# Patient Record
Sex: MALE | Race: WHITE | NOT HISPANIC OR LATINO | Employment: OTHER | URBAN - METROPOLITAN AREA
[De-identification: names, ages, dates, MRNs, and addresses within clinical notes are randomized per-mention and may not be internally consistent; named-entity substitution may affect disease eponyms.]

---

## 2017-09-27 ENCOUNTER — COMMUNICATION - HEALTHEAST (OUTPATIENT)
Dept: FAMILY MEDICINE | Facility: CLINIC | Age: 52
End: 2017-09-27

## 2017-10-03 ENCOUNTER — OFFICE VISIT - HEALTHEAST (OUTPATIENT)
Dept: FAMILY MEDICINE | Facility: CLINIC | Age: 52
End: 2017-10-03

## 2017-10-03 DIAGNOSIS — N48.1 BALANITIS: ICD-10-CM

## 2017-10-03 DIAGNOSIS — J02.9 SORE THROAT: ICD-10-CM

## 2017-10-03 DIAGNOSIS — B35.6 TINEA CRURIS: ICD-10-CM

## 2017-10-11 ENCOUNTER — COMMUNICATION - HEALTHEAST (OUTPATIENT)
Dept: FAMILY MEDICINE | Facility: CLINIC | Age: 52
End: 2017-10-11

## 2017-11-16 ENCOUNTER — AMBULATORY - HEALTHEAST (OUTPATIENT)
Dept: FAMILY MEDICINE | Facility: CLINIC | Age: 52
End: 2017-11-16

## 2017-11-16 ENCOUNTER — COMMUNICATION - HEALTHEAST (OUTPATIENT)
Dept: FAMILY MEDICINE | Facility: CLINIC | Age: 52
End: 2017-11-16

## 2017-11-28 ENCOUNTER — OFFICE VISIT - HEALTHEAST (OUTPATIENT)
Dept: FAMILY MEDICINE | Facility: CLINIC | Age: 52
End: 2017-11-28

## 2017-11-28 DIAGNOSIS — B35.9 TINEA: ICD-10-CM

## 2017-11-28 DIAGNOSIS — M79.675 PAIN OF GREAT TOE, LEFT: ICD-10-CM

## 2017-11-28 DIAGNOSIS — L30.9 DERMATITIS: ICD-10-CM

## 2017-11-28 DIAGNOSIS — Z00.00 HEALTH CARE MAINTENANCE: ICD-10-CM

## 2018-01-18 ENCOUNTER — OFFICE VISIT - HEALTHEAST (OUTPATIENT)
Dept: FAMILY MEDICINE | Facility: CLINIC | Age: 53
End: 2018-01-18

## 2018-01-18 DIAGNOSIS — N48.1 BALANITIS: ICD-10-CM

## 2018-01-18 DIAGNOSIS — Z00.00 HEALTH CARE MAINTENANCE: ICD-10-CM

## 2018-01-18 LAB
ALBUMIN SERPL-MCNC: 4.2 G/DL (ref 3.5–5)
ALP SERPL-CCNC: 76 U/L (ref 45–120)
ALT SERPL W P-5'-P-CCNC: 40 U/L (ref 0–45)
ANION GAP SERPL CALCULATED.3IONS-SCNC: 11 MMOL/L (ref 5–18)
AST SERPL W P-5'-P-CCNC: 28 U/L (ref 0–40)
BILIRUB SERPL-MCNC: 1.8 MG/DL (ref 0–1)
BUN SERPL-MCNC: 13 MG/DL (ref 8–22)
CALCIUM SERPL-MCNC: 9.8 MG/DL (ref 8.5–10.5)
CHLORIDE BLD-SCNC: 102 MMOL/L (ref 98–107)
CHOLEST SERPL-MCNC: 233 MG/DL
CO2 SERPL-SCNC: 27 MMOL/L (ref 22–31)
CREAT SERPL-MCNC: 0.96 MG/DL (ref 0.7–1.3)
ERYTHROCYTE [DISTWIDTH] IN BLOOD BY AUTOMATED COUNT: 10.9 % (ref 11–14.5)
FASTING STATUS PATIENT QL REPORTED: YES
GFR SERPL CREATININE-BSD FRML MDRD: >60 ML/MIN/1.73M2
GLUCOSE BLD-MCNC: 90 MG/DL (ref 70–125)
HCT VFR BLD AUTO: 49.7 % (ref 40–54)
HDLC SERPL-MCNC: 68 MG/DL
HGB BLD-MCNC: 16.8 G/DL (ref 14–18)
HIV 1+2 AB+HIV1 P24 AG SERPL QL IA: NEGATIVE
LDLC SERPL CALC-MCNC: 153 MG/DL
MCH RBC QN AUTO: 32.9 PG (ref 27–34)
MCHC RBC AUTO-ENTMCNC: 33.8 G/DL (ref 32–36)
MCV RBC AUTO: 98 FL (ref 80–100)
PLATELET # BLD AUTO: 226 THOU/UL (ref 140–440)
PMV BLD AUTO: 7.8 FL (ref 7–10)
POTASSIUM BLD-SCNC: 4.3 MMOL/L (ref 3.5–5)
PROT SERPL-MCNC: 8 G/DL (ref 6–8)
PSA SERPL-MCNC: 1.3 NG/ML (ref 0–3.5)
RBC # BLD AUTO: 5.1 MILL/UL (ref 4.4–6.2)
SODIUM SERPL-SCNC: 140 MMOL/L (ref 136–145)
TRIGL SERPL-MCNC: 62 MG/DL
WBC: 5.9 THOU/UL (ref 4–11)

## 2018-01-18 ASSESSMENT — MIFFLIN-ST. JEOR: SCORE: 1777.36

## 2018-01-19 LAB
C TRACH DNA SPEC QL PROBE+SIG AMP: NEGATIVE
HSV1 IGG SERPL QL IA: POSITIVE
HSV2 IGG SERPL QL IA: NEGATIVE
N GONORRHOEA DNA SPEC QL NAA+PROBE: NEGATIVE
SYPHILIS RPR SCREEN - HISTORICAL: NORMAL

## 2018-06-05 ENCOUNTER — OFFICE VISIT - HEALTHEAST (OUTPATIENT)
Dept: FAMILY MEDICINE | Facility: CLINIC | Age: 53
End: 2018-06-05

## 2018-06-05 DIAGNOSIS — N48.1 BALANITIS: ICD-10-CM

## 2018-06-05 DIAGNOSIS — M15.9 GENERALIZED OA: ICD-10-CM

## 2018-06-05 DIAGNOSIS — M72.2 PLANTAR FASCIITIS: ICD-10-CM

## 2018-07-24 ENCOUNTER — COMMUNICATION - HEALTHEAST (OUTPATIENT)
Dept: FAMILY MEDICINE | Facility: CLINIC | Age: 53
End: 2018-07-24

## 2018-07-24 ENCOUNTER — COMMUNICATION - HEALTHEAST (OUTPATIENT)
Dept: SCHEDULING | Facility: CLINIC | Age: 53
End: 2018-07-24

## 2018-07-24 ENCOUNTER — OFFICE VISIT - HEALTHEAST (OUTPATIENT)
Dept: FAMILY MEDICINE | Facility: CLINIC | Age: 53
End: 2018-07-24

## 2018-07-24 DIAGNOSIS — N48.1 BALANITIS: ICD-10-CM

## 2018-07-24 DIAGNOSIS — K57.32 DIVERTICULITIS OF COLON: ICD-10-CM

## 2018-12-23 ENCOUNTER — OFFICE VISIT - HEALTHEAST (OUTPATIENT)
Dept: FAMILY MEDICINE | Facility: CLINIC | Age: 53
End: 2018-12-23

## 2018-12-23 DIAGNOSIS — K57.92 DIVERTICULITIS: ICD-10-CM

## 2019-01-29 ENCOUNTER — OFFICE VISIT - HEALTHEAST (OUTPATIENT)
Dept: FAMILY MEDICINE | Facility: CLINIC | Age: 54
End: 2019-01-29

## 2019-01-29 ENCOUNTER — RECORDS - HEALTHEAST (OUTPATIENT)
Dept: GENERAL RADIOLOGY | Facility: CLINIC | Age: 54
End: 2019-01-29

## 2019-01-29 DIAGNOSIS — L30.9 DERMATITIS: ICD-10-CM

## 2019-01-29 DIAGNOSIS — G89.29 OTHER CHRONIC PAIN: ICD-10-CM

## 2019-01-29 DIAGNOSIS — G89.29 CHRONIC RIGHT SHOULDER PAIN: ICD-10-CM

## 2019-01-29 DIAGNOSIS — M25.511 CHRONIC RIGHT SHOULDER PAIN: ICD-10-CM

## 2019-01-29 DIAGNOSIS — M25.511 PAIN IN RIGHT SHOULDER: ICD-10-CM

## 2019-01-29 DIAGNOSIS — R91.1 LUNG NODULE: ICD-10-CM

## 2019-01-31 ENCOUNTER — HOSPITAL ENCOUNTER (OUTPATIENT)
Dept: CT IMAGING | Facility: HOSPITAL | Age: 54
Discharge: HOME | End: 2019-01-31
Attending: FAMILY MEDICINE

## 2019-01-31 DIAGNOSIS — R91.1 LUNG NODULE: ICD-10-CM

## 2019-02-13 ENCOUNTER — RECORDS - HEALTHEAST (OUTPATIENT)
Dept: ADMINISTRATIVE | Facility: OTHER | Age: 54
End: 2019-02-13

## 2019-03-13 ENCOUNTER — RECORDS - HEALTHEAST (OUTPATIENT)
Dept: ADMINISTRATIVE | Facility: OTHER | Age: 54
End: 2019-03-13

## 2019-11-22 ENCOUNTER — RECORDS - HEALTHEAST (OUTPATIENT)
Dept: ADMINISTRATIVE | Facility: OTHER | Age: 54
End: 2019-11-22

## 2020-01-10 ENCOUNTER — OFFICE VISIT - HEALTHEAST (OUTPATIENT)
Dept: FAMILY MEDICINE | Facility: CLINIC | Age: 55
End: 2020-01-10

## 2020-01-10 DIAGNOSIS — R06.02 SHORTNESS OF BREATH: ICD-10-CM

## 2020-01-10 DIAGNOSIS — Z00.00 HEALTH CARE MAINTENANCE: ICD-10-CM

## 2020-01-10 DIAGNOSIS — B35.6 TINEA CRURIS: ICD-10-CM

## 2020-01-10 DIAGNOSIS — R07.9 CHEST PAIN, UNSPECIFIED TYPE: ICD-10-CM

## 2020-01-10 DIAGNOSIS — G56.03 BILATERAL CARPAL TUNNEL SYNDROME: ICD-10-CM

## 2020-01-10 DIAGNOSIS — B35.4 TINEA CORPORIS: ICD-10-CM

## 2020-01-10 LAB
ALBUMIN SERPL-MCNC: 4.4 G/DL (ref 3.5–5)
ALP SERPL-CCNC: 78 U/L (ref 45–120)
ALT SERPL W P-5'-P-CCNC: 47 U/L (ref 0–45)
ANION GAP SERPL CALCULATED.3IONS-SCNC: 10 MMOL/L (ref 5–18)
AST SERPL W P-5'-P-CCNC: 25 U/L (ref 0–40)
BILIRUB SERPL-MCNC: 1 MG/DL (ref 0–1)
BUN SERPL-MCNC: 20 MG/DL (ref 8–22)
CALCIUM SERPL-MCNC: 9.6 MG/DL (ref 8.5–10.5)
CHLORIDE BLD-SCNC: 105 MMOL/L (ref 98–107)
CHOLEST SERPL-MCNC: 252 MG/DL
CO2 SERPL-SCNC: 24 MMOL/L (ref 22–31)
CREAT SERPL-MCNC: 1.2 MG/DL (ref 0.7–1.3)
ERYTHROCYTE [DISTWIDTH] IN BLOOD BY AUTOMATED COUNT: 11.4 % (ref 11–14.5)
FASTING STATUS PATIENT QL REPORTED: ABNORMAL
FERRITIN SERPL-MCNC: 400 NG/ML (ref 27–300)
GFR SERPL CREATININE-BSD FRML MDRD: >60 ML/MIN/1.73M2
GLUCOSE BLD-MCNC: 106 MG/DL (ref 70–125)
HBA1C MFR BLD: 5.1 % (ref 3.5–6)
HCT VFR BLD AUTO: 46.3 % (ref 40–54)
HDLC SERPL-MCNC: 66 MG/DL
HGB BLD-MCNC: 15.8 G/DL (ref 14–18)
LDLC SERPL CALC-MCNC: 174 MG/DL
MCH RBC QN AUTO: 32.8 PG (ref 27–34)
MCHC RBC AUTO-ENTMCNC: 34.1 G/DL (ref 32–36)
MCV RBC AUTO: 96 FL (ref 80–100)
PLATELET # BLD AUTO: 199 THOU/UL (ref 140–440)
PMV BLD AUTO: 7.4 FL (ref 7–10)
POTASSIUM BLD-SCNC: 4 MMOL/L (ref 3.5–5)
PROT SERPL-MCNC: 7.6 G/DL (ref 6–8)
PSA SERPL-MCNC: 0.7 NG/ML (ref 0–3.5)
RBC # BLD AUTO: 4.82 MILL/UL (ref 4.4–6.2)
SODIUM SERPL-SCNC: 139 MMOL/L (ref 136–145)
TRIGL SERPL-MCNC: 61 MG/DL
TSH SERPL DL<=0.005 MIU/L-ACNC: 1.65 UIU/ML (ref 0.3–5)
VIT B12 SERPL-MCNC: 687 PG/ML (ref 213–816)
WBC: 7.3 THOU/UL (ref 4–11)

## 2020-01-10 RX ORDER — CALCIPOTRIENE 50 UG/G
OINTMENT TOPICAL
Status: SHIPPED | COMMUNITY
Start: 2019-11-24

## 2020-01-10 ASSESSMENT — MIFFLIN-ST. JEOR: SCORE: 1841.1

## 2020-01-13 ENCOUNTER — COMMUNICATION - HEALTHEAST (OUTPATIENT)
Dept: FAMILY MEDICINE | Facility: CLINIC | Age: 55
End: 2020-01-13

## 2020-01-13 ENCOUNTER — AMBULATORY - HEALTHEAST (OUTPATIENT)
Dept: FAMILY MEDICINE | Facility: CLINIC | Age: 55
End: 2020-01-13

## 2020-01-13 DIAGNOSIS — E78.2 MIXED HYPERLIPIDEMIA: ICD-10-CM

## 2020-01-13 RX ORDER — ATORVASTATIN CALCIUM 20 MG/1
20 TABLET, FILM COATED ORAL DAILY
Qty: 30 TABLET | Refills: 11 | Status: SHIPPED | OUTPATIENT
Start: 2020-01-13

## 2020-01-17 ENCOUNTER — HOSPITAL ENCOUNTER (OUTPATIENT)
Dept: NUCLEAR MEDICINE | Facility: HOSPITAL | Age: 55
Discharge: HOME | End: 2020-01-17
Attending: FAMILY MEDICINE

## 2020-01-17 ENCOUNTER — HOSPITAL ENCOUNTER (OUTPATIENT)
Dept: CARDIOLOGY | Facility: HOSPITAL | Age: 55
Discharge: HOME | End: 2020-01-17
Attending: FAMILY MEDICINE

## 2020-01-17 ENCOUNTER — COMMUNICATION - HEALTHEAST (OUTPATIENT)
Dept: FAMILY MEDICINE | Facility: CLINIC | Age: 55
End: 2020-01-17

## 2020-01-17 ENCOUNTER — AMBULATORY - HEALTHEAST (OUTPATIENT)
Dept: FAMILY MEDICINE | Facility: CLINIC | Age: 55
End: 2020-01-17

## 2020-01-17 DIAGNOSIS — R07.9 CHEST PAIN, UNSPECIFIED TYPE: ICD-10-CM

## 2020-01-17 DIAGNOSIS — R06.02 SHORTNESS OF BREATH: ICD-10-CM

## 2020-01-17 LAB
CV STRESS CURRENT BP HE: NORMAL
CV STRESS CURRENT HR HE: 100
CV STRESS CURRENT HR HE: 106
CV STRESS CURRENT HR HE: 108
CV STRESS CURRENT HR HE: 108
CV STRESS CURRENT HR HE: 110
CV STRESS CURRENT HR HE: 110
CV STRESS CURRENT HR HE: 111
CV STRESS CURRENT HR HE: 114
CV STRESS CURRENT HR HE: 120
CV STRESS CURRENT HR HE: 124
CV STRESS CURRENT HR HE: 126
CV STRESS CURRENT HR HE: 130
CV STRESS CURRENT HR HE: 133
CV STRESS CURRENT HR HE: 134
CV STRESS CURRENT HR HE: 135
CV STRESS CURRENT HR HE: 136
CV STRESS CURRENT HR HE: 137
CV STRESS CURRENT HR HE: 137
CV STRESS CURRENT HR HE: 139
CV STRESS CURRENT HR HE: 140
CV STRESS CURRENT HR HE: 141
CV STRESS CURRENT HR HE: 145
CV STRESS CURRENT HR HE: 147
CV STRESS CURRENT HR HE: 147
CV STRESS CURRENT HR HE: 152
CV STRESS CURRENT HR HE: 74
CV STRESS CURRENT HR HE: 74
CV STRESS CURRENT HR HE: 75
CV STRESS CURRENT HR HE: 76
CV STRESS CURRENT HR HE: 82
CV STRESS CURRENT HR HE: 87
CV STRESS CURRENT HR HE: 88
CV STRESS CURRENT HR HE: 91
CV STRESS CURRENT HR HE: 92
CV STRESS CURRENT HR HE: 95
CV STRESS CURRENT HR HE: 96
CV STRESS CURRENT HR HE: 96
CV STRESS CURRENT HR HE: 98
CV STRESS DEVIATION TIME HE: NORMAL
CV STRESS ECHO PERCENT HR HE: NORMAL
CV STRESS EXERCISE STAGE HE: NORMAL
CV STRESS FINAL RESTING BP HE: NORMAL
CV STRESS FINAL RESTING HR HE: 95
CV STRESS MAX HR HE: 156
CV STRESS MAX TREADMILL GRADE HE: 16
CV STRESS MAX TREADMILL SPEED HE: 4.2
CV STRESS PEAK DIA BP HE: NORMAL
CV STRESS PEAK SYS BP HE: NORMAL
CV STRESS PHASE HE: NORMAL
CV STRESS PROTOCOL HE: NORMAL
CV STRESS RESTING PT POSITION HE: NORMAL
CV STRESS RESTING PT POSITION HE: NORMAL
CV STRESS ST DEVIATION AMOUNT HE: NORMAL
CV STRESS ST DEVIATION ELEVATION HE: NORMAL
CV STRESS ST EVELATION AMOUNT HE: NORMAL
CV STRESS TEST TYPE HE: NORMAL
CV STRESS TOTAL STAGE TIME MIN 1 HE: NORMAL
NUC STRESS EJECTION FRACTION: 55 %
RATE PRESSURE PRODUCT: NORMAL
STRESS ANGINA INDEX: 2
STRESS ECHO BASELINE DIASTOLIC HE: 73
STRESS ECHO BASELINE HR: 74
STRESS ECHO BASELINE SYSTOLIC BP: 108
STRESS ECHO CALCULATED PERCENT HR: 93 %
STRESS ECHO LAST STRESS DIASTOLIC BP: 82
STRESS ECHO LAST STRESS HR: 152
STRESS ECHO LAST STRESS SYSTOLIC BP: 148
STRESS ECHO POST ESTIMATED WORKLOAD: 10.9 METS
STRESS ECHO POST EXERCISE DUR MIN: 9 MIN
STRESS ECHO POST EXERCISE DUR SEC: 20 SEC
STRESS ECHO TARGET HR: 167

## 2020-01-17 ASSESSMENT — MIFFLIN-ST. JEOR: SCORE: 1818.76

## 2020-01-23 ENCOUNTER — HOSPITAL ENCOUNTER (OUTPATIENT)
Dept: RESPIRATORY THERAPY | Facility: HOSPITAL | Age: 55
Discharge: HOME | End: 2020-01-23
Attending: FAMILY MEDICINE

## 2020-01-23 DIAGNOSIS — R06.02 SHORTNESS OF BREATH: ICD-10-CM

## 2020-02-13 ENCOUNTER — COMMUNICATION - HEALTHEAST (OUTPATIENT)
Dept: FAMILY MEDICINE | Facility: CLINIC | Age: 55
End: 2020-02-13

## 2020-02-13 DIAGNOSIS — B35.6 TINEA CRURIS: ICD-10-CM

## 2020-02-13 DIAGNOSIS — B35.4 TINEA CORPORIS: ICD-10-CM

## 2020-02-17 RX ORDER — CLOTRIMAZOLE 1 %
CREAM (GRAM) TOPICAL
Qty: 30 G | Refills: 0 | Status: SHIPPED | OUTPATIENT
Start: 2020-02-17

## 2020-02-17 RX ORDER — DESONIDE 0.5 MG/G
CREAM TOPICAL
Qty: 60 G | Refills: 1 | Status: SHIPPED | OUTPATIENT
Start: 2020-02-17

## 2020-02-19 ENCOUNTER — COMMUNICATION - HEALTHEAST (OUTPATIENT)
Dept: SCHEDULING | Facility: CLINIC | Age: 55
End: 2020-02-19

## 2020-02-19 DIAGNOSIS — K57.32 DIVERTICULITIS OF COLON: ICD-10-CM

## 2020-04-28 ENCOUNTER — COMMUNICATION - HEALTHEAST (OUTPATIENT)
Dept: SCHEDULING | Facility: CLINIC | Age: 55
End: 2020-04-28

## 2020-04-28 ENCOUNTER — OFFICE VISIT - HEALTHEAST (OUTPATIENT)
Dept: FAMILY MEDICINE | Facility: CLINIC | Age: 55
End: 2020-04-28

## 2020-04-28 DIAGNOSIS — L40.9 PSORIASIS: ICD-10-CM

## 2020-04-28 RX ORDER — TACROLIMUS 0.3 MG/G
OINTMENT TOPICAL
Qty: 100 G | Refills: 0 | Status: SHIPPED | OUTPATIENT
Start: 2020-04-28

## 2020-04-28 NOTE — ASSESSMENT & PLAN NOTE
Patient reports flare of his psoriasis, rash was evaluated somewhat but not to my satisfaction by video visit which was not a good connection and I was only able to see his face for a few moments during the long interaction where we went back and fourth from [facility] to [facility]. The rash on his face could have been consistent with psoriasis.     protopic topical was rx'd   Derm consult was recommended. Order placed.

## 2020-05-05 ENCOUNTER — RECORDS - HEALTHEAST (OUTPATIENT)
Dept: ADMINISTRATIVE | Facility: OTHER | Age: 55
End: 2020-05-05

## 2020-05-15 ENCOUNTER — RECORDS - HEALTHEAST (OUTPATIENT)
Dept: ADMINISTRATIVE | Facility: OTHER | Age: 55
End: 2020-05-15

## 2021-05-28 ENCOUNTER — RECORDS - HEALTHEAST (OUTPATIENT)
Dept: ADMINISTRATIVE | Facility: CLINIC | Age: 56
End: 2021-05-28

## 2021-05-29 ENCOUNTER — RECORDS - HEALTHEAST (OUTPATIENT)
Dept: ADMINISTRATIVE | Facility: CLINIC | Age: 56
End: 2021-05-29

## 2021-05-31 VITALS — BODY MASS INDEX: 30.96 KG/M2 | HEIGHT: 69 IN | WEIGHT: 209 LBS

## 2021-05-31 VITALS — WEIGHT: 206 LBS | BODY MASS INDEX: 30.42 KG/M2

## 2021-05-31 VITALS — BODY MASS INDEX: 32.34 KG/M2 | WEIGHT: 219 LBS

## 2021-06-01 VITALS — BODY MASS INDEX: 32.14 KG/M2 | WEIGHT: 219.2 LBS

## 2021-06-01 VITALS — WEIGHT: 222 LBS | BODY MASS INDEX: 32.55 KG/M2

## 2021-06-02 ENCOUNTER — RECORDS - HEALTHEAST (OUTPATIENT)
Dept: ADMINISTRATIVE | Facility: CLINIC | Age: 56
End: 2021-06-02

## 2021-06-02 VITALS — WEIGHT: 222 LBS | BODY MASS INDEX: 32.55 KG/M2

## 2021-06-02 VITALS — WEIGHT: 215.4 LBS | BODY MASS INDEX: 31.58 KG/M2

## 2021-06-04 VITALS
SYSTOLIC BLOOD PRESSURE: 129 MMHG | HEIGHT: 69 IN | RESPIRATION RATE: 16 BRPM | HEART RATE: 79 BPM | BODY MASS INDEX: 33.3 KG/M2 | TEMPERATURE: 98.3 F | OXYGEN SATURATION: 97 % | DIASTOLIC BLOOD PRESSURE: 83 MMHG | WEIGHT: 224.8 LBS

## 2021-06-04 VITALS — BODY MASS INDEX: 32.44 KG/M2 | HEIGHT: 69 IN | WEIGHT: 219 LBS

## 2021-06-05 NOTE — TELEPHONE ENCOUNTER
Pt notified of result note below. Pt stated he will start a cholesterol medication but want to know if there are any side effects or special instructions that go along with it. Pharmacy is listed as current per pt.     ----- Message from Benedict Holly MD sent at 1/13/2020  9:37 AM CST -----  B12 levels are normal.  Thyroid levels are normal as well.  Iron levels, ferritin, is elevated.  With normal hemoglobin levels I would like to recheck these levels in 1-2 months.   PSA is stable- we will monitor yearly.  No signs of diabetes.  Kidney and liver enzymes are normal.  Cholesterol levels remain elevated and I would recommend starting a cholesterol medication- if you are in agreement I will send on to the pharmacy.

## 2021-06-05 NOTE — TELEPHONE ENCOUNTER
Informed patient of message below.     Patient would like to know if the next step is to check the lung because of the shortness of breath. Please advise.

## 2021-06-05 NOTE — PROGRESS NOTES
ASSESSMENT/PLAN  1. Chest pain, unspecified type  Symptoms are not consistent, do not seem regular with activity- possible deconditioning or airway related like reactive airway diseased- due to age, other comorbid medical conditions we will further investigate cardiac etiology with stress test  If negative will proceed with pulmonary testing- patient is in agreement    2. Shortness of breath  Possible obstructive airway disease- will rule out cardiac etiology first- then pursue testing for lungs- pt is in agreement    3. Tinea cruris  Signs of tinea on exam- will continue with topica treatment  - desonide (DESOWEN) 0.05 % cream; Apply to affected area 2 times daily  Dispense: 60 g; Refill: 1    4. Tinea corporis  See number 3 above  - clotrimazole (LOTRIMIN) 1 % cream; Apply to skin rash twice daily  Dispense: 30 g; Refill: 0    5. Health care maintenance  Pt has some concerns about his health and labs and would like testing today  - HM2(CBC w/o Differential)  - Ferritin  - Comprehensive Metabolic Panel  - Glycosylated Hemoglobin A1c  - Lipid Cascade RANDOM  - Vitamin B12  - PSA (Prostatic-Specific Antigen), Annual Screen  - Thyroid Cascade    6. Bilateral carpal tunnel syndrome  Pt has been having some numbness in hands BL- especially at night- discussed limiting ROM of hands with activity during the day to prevent irritation        SUBJECTIVE:   Chief Complaint   Patient presents with     Shortness of Breath     c/o feeling short of breath with exertion, intermittent chest discomfort      Concerns     Would like to be tested for diabetes, lips turning darker in color, salt taste in mouth      Circulatory Problem     At night hands falling asleep at night      Rash     Various rashes all over     Mass     Right groin lump X3-4 days      Orlando Medeiros 54 y.o. male    Current Outpatient Medications   Medication Sig Dispense Refill     calcipotriene (DOVONOX) 0.005 % ointment APPLY AA ON BODY AND GROIN BID PRN    "    atorvastatin (LIPITOR) 20 MG tablet Take 1 tablet (20 mg total) by mouth daily. 30 tablet 11     clotrimazole (LOTRIMIN) 1 % cream Apply to skin rash twice daily 30 g 0     desonide (DESOWEN) 0.05 % cream Apply to affected area 2 times daily 60 g 1     No current facility-administered medications for this visit.      Allergies: Shellfish containing products   No LMP for male patient.    HPI:   Pt is here for multiple concerns today- including rash in the groin, on the torso and come chest discomfort and shortness of breath at times- not consistent and not with physical activity always. Discussed possible etiology- including cardiac, lung or physical deconditioning.  We discussed stress test which he is in agreement- and we will f/u based on results.  If negative- will pursue lung function testing.  Pt has rashes that bother him- both on torso and groin- discussed topical treatment for both tinea infections.  Pt's BL hands falling asleep at night - discussed likely carpal tunnel- pt would like to monitor at this time.  He feels like he needs some labs testing- overall just feels as though something is off- discussed lab work with the patient today.  Pt is in agreement and we will f/u based on results.    ROS: negative except as per HPI    OBJECTIVE:   The patient appears well, alert, oriented x 3, in no distress.  /83 (Patient Site: Left Arm, Patient Position: Sitting, Cuff Size: Adult Large)   Pulse 79   Temp 98.3  F (36.8  C) (Oral)   Resp 16   Ht 5' 8.75\" (1.746 m)   Wt (!) 224 lb 12.8 oz (102 kg)   SpO2 97%   BMI 33.44 kg/m      Lungs: clear, good air entry, no wheezes, rhonchi or rales.   Cardiac: S1 and S2 normal, no murmurs, regular rate and rhythm.   Abdomen: normal bowel sounds, soft without tenderness, guarding, mass or organomegaly.   Extremities: show no edema, normal peripheral pulses.   Neurological: normal, no focal findings.  Skin: clear, dry, no rashes/lesions  Psych- normal mood and " affect      Pt states an understanding and agrees to the above plan.  Greater than 40 minutes was spent today in interview and examination with Orlando Medeiros with more than 50% of that time in counseling and coordination of care.

## 2021-06-05 NOTE — TELEPHONE ENCOUNTER
----- Message from Benedict Holly MD sent at 1/17/2020  2:04 PM CST -----  Please inform patient that stress test showed no concerns.  No follow up is needed for this.  Dr. Holly

## 2021-06-05 NOTE — PROGRESS NOTES
RESPIRATORY CARE NOTE     Patient Name: Orlando Medeiros  Today's Date: 1/23/2020     RESPIRATORY CARE NOTE     Patient Name: Orlando Medeiros  Today's Date: 1/23/2020     Complete PFT done. Pt performed tests with good effort. Test results meet ATS standards for repeatbility and acceptability.  Albuterol 2.5 mg nebulizer used for bronchodilation. Results scanned into epic. Pt left in no distress.       Giana Fuentes, LRT          Giana Fuentes

## 2021-06-05 NOTE — TELEPHONE ENCOUNTER
I would like him to take the medication at night.  If new muscle or joint pain starts to contact me at clinic.  Dr. Holly

## 2021-06-06 NOTE — TELEPHONE ENCOUNTER
RN cannot approve Refill Request    RN can NOT refill this medication Protocol failed and NO refill given. Last office visit: 1/10/2020 Benedict Holly MD Last Physical: 1/18/2018 Last MTM visit: Visit date not found Last visit same specialty: 1/10/2020 Benedict Holly MD.  Next visit within 3 mo: Visit date not found  Next physical within 3 mo: Visit date not found      [place], [facility] Triage/Med Refill 2/16/2020    Requested Prescriptions   Pending Prescriptions Disp Refills     clotrimazole (LOTRIMIN) 1 % cream 30 g 0     Sig: Apply to skin rash twice daily       There is no refill protocol information for this order        desonide (DESOWEN) 0.05 % cream 60 g 1     Sig: Apply to affected area 2 times daily       There is no refill protocol information for this order

## 2021-06-06 NOTE — TELEPHONE ENCOUNTER
Please inform patient that medication was sent to the pharmacy of request  Please inform if symptoms are worsening he needs to be seen   Improved

## 2021-06-06 NOTE — TELEPHONE ENCOUNTER
RN Assessment/Reason for Call:   Okay to leave Detailed Message  Sarwat calling in, he is in [place] ; having a bout of diverticulitis, had for many years.  Needs/pefers Amoxicillin,tri cavilla (sp?) antibiotic he usually gets.  Abd and back pain nausea, constipation.  Comes back Sunday.    [address] [place] [address]  [phone]    RN Action/Disposition:  Vocera contacted; .  Agrees to plan.     Giana Aguilar, MARYCHUY    [facility] Triage/med refill  2/19/2020  10:41 AM

## 2021-06-07 NOTE — PROGRESS NOTES
ASSESSMENT AND PLAN:    see separate notes attached to this visit regarding details of  video visit and time spent.        Problem List Items Addressed This Visit        Unprioritized    Psoriasis - Primary     Patient reports flare of his psoriasis, rash was evaluated somewhat but not to my satisfaction by video visit which was not a good connection and I was only able to see his face for a few moments during the long interaction where we went back and fourth from [facility] to [facility]. The rash on his face could have been consistent with psoriasis.     protopic topical was rx'd   Derm consult was recommended. Order placed.          Relevant Medications    tacrolimus (PROTOPIC) 0.03 % ointment    Other Relevant Orders    Ambulatory referral to Dermatology           Chief Complaint   Patient presents with     Rash     Patient says he has had issues with fungus and psoriasis for a couple of years.  He says it's everywhere now - onto his face, ears and eyelids and legs.        HPI  Orlando Medeiros is a 54 y.o. male is concerned about a rash all over his body and tried to present via video visit today.  He says the rash is itchy. He tells me he has seen derm in the past and has had psoriasis for all his life.  He thinks his psoriasis is flaring.  When asked what has helped in the past he says nothing has ever helped.     I did review dermatology notes from 2019, and could not figure out what they treated him with because it appears they did a biopsy and treated based on that, and the result was not available.     Social History     Tobacco Use   Smoking Status Former Smoker     Last attempt to quit: 2015     Years since quittin.0   Smokeless Tobacco Never Used      Current Outpatient Medications on File Prior to Visit   Medication Sig Dispense Refill     atorvastatin (LIPITOR) 20 MG tablet Take 1 tablet (20 mg total) by mouth daily. 30 tablet 11     calcipotriene (DOVONOX) 0.005 % ointment APPLY AA ON BODY  AND GROIN BID PRN       clotrimazole (LOTRIMIN) 1 % cream Apply to skin rash twice daily 30 g 0     desonide (DESOWEN) 0.05 % cream Apply to affected area 2 times daily 60 g 1     No current facility-administered medications on file prior to visit.       Allergies   Allergen Reactions     Shellfish Containing Products Hives       Review of Systems   Constitutional: Negative.    HENT: Negative.    Eyes: Negative.    Respiratory: Negative.    Cardiovascular: Negative.    Gastrointestinal: Negative.    Endocrine: Negative.    Genitourinary: Negative.    Musculoskeletal: Negative.    Skin: Negative.    Neurological: Negative.    Hematological: Negative.    Psychiatric/Behavioral: Negative.         OBJECTIVE: There were no vitals taken for this visit.   Physical Exam  Constitutional:       General: He is not in acute distress.     Appearance: He is well-developed.   HENT:      Head: Normocephalic and atraumatic.   Eyes:      Conjunctiva/sclera: Conjunctivae normal.   Neck:      Musculoskeletal: Neck supple.   Pulmonary:      Effort: Pulmonary effort is normal.   Musculoskeletal: Normal range of motion.   Skin:     Comments: , although the wifi connection on his end did not seem strong enough to sustain video imaging, so although I did see him for part of the time, I could not really examine rash aside from grossly seeing his face intermittently during our interaction.  It did seem like his eyelids looked pink, but it was difficult to tell as the resolution of the picture was not very good.    Neurological:      Mental Status: He is alert and oriented to person, place, and time.          Additional History from Old Records Summarized (2): yes  Decision to Obtain Records (1): no  Radiology Tests Summarized or Ordered (1): no  Labs Reviewed or Ordered (1): yes looking for biopsy result  Medicine Test Summarized or Ordered (1): yes  Independent Review of EKG or X-RAY(2 each): no    This note was created using Dragon  dictation.  Please excuse any grammatical errors.

## 2021-06-07 NOTE — PROGRESS NOTES
"Orlando Medeiros is a 54 y.o. male who is being evaluated via a billable video visit.      The patient has been notified of following:     \"This video visit will be conducted via a call between you and your physician/provider. We have found that certain health care needs can be provided without the need for an in-person physical exam.  This service lets us provide the care you need with a video conversation.  If a prescription is necessary we can send it directly to your pharmacy.  If lab work is needed we can place an order for that and you can then stop by our lab to have the test done at a later time.    Video visits are billed at different rates depending on your insurance coverage. Please reach out to your insurance provider with any questions.    If during the course of the call the physician/provider feels a video visit is not appropriate, you will not be charged for this service.\"    Patient has given verbal consent to a Video visit? Yes    Patient would like to receive their AVS by AVS Preference: Don.    Patient would like the video invitation sent by: Send to e-mail at: [email or web address]    Will anyone else be joining your video visit? No        Video Start Time: 3:47 PM    Additional provider notes: see separate notes attached to this visit         Video-Visit Details    Type of service:  Video Visit    Video End Time (time video stopped): 4:04 PM  Originating Location (pt. Location): Home    Distant Location (provider location):  [facility]     Mode of Communication:  Video Conference via [employer] - he had very poor wifi connection so it was difficult to do this visit.  I did eventually switch to doximity and was able to see rash on his face for a few moments before that connection was also lost.      Traci Crawford MD    "

## 2021-06-07 NOTE — TELEPHONE ENCOUNTER
FNA triage call :  PCP is Benedict Holly MD , [facility] .   Presenting problem :  Pt called.  Has fungal infection and psiorosis Groin , buttock , chest , armpits and legs that  started 2 years ago ( groin area is raw for the last 2 years )  and working with PCP to treat = and is out of the topical cream Rx , and spreading to eyelids  With some swelling  , inside the left ear and spreading down legs and almost on entire body - itchy and intermittent scant  pus    and  Needs blood iron count is down ( Iron was high ) .  Currently : no fever, still working as   / 1&0 , eating and activity are all normal .   Guideline used : Rash or reness - widespread A [place].   COVID 19 Nurse Triage Plan/Patient Instructions    Please be aware that novel coronavirus (COVID-19) may be circulating in the community. If you develop symptoms such as fever, cough, or SOB or if you have concerns about the presence of another infection including coronavirus (COVID-19), please contact your health care provider or visit [email or web address].     Disposition/Instructions    Patient to stay at home and follow home care protocol based instructions. and Patient to have scheduled Telephone Visit with a provider. Follow System Ambulatory Workflow for COVID 19.     The clinic staff will assist you to schedule an appointment to complete the Telephone Visit with a provider during normal clinic hours.       Call Back If: Your symptoms worsen before you are able to complete your Telephone Visit with a provider.        Thank you for limiting contact with others, wearing a simple mask to cover your cough, practice good hand hygiene habits and accessing our virtual services where possible to limit the spread of this virus.    For more information about COVID19 and options for caring for yourself at home, please visit the CDC website at https://www.cdc.gov/coronavirus/2019-ncov/about/steps-when-sick.html  For more options for care at [facility], please visit our website at [email or web address]    For more information, please use the [place] Department of Health COVID-19 Website: https://www.health.[facility].[place]/diseases/coronavirus/index.html  [place] Department of Health ([facility]) COVID-19 Hotlines (Interpreters available):      Health questions: Phone Number: [phone] or [phone] and Hours: 7 a.m. to 7 p.m.    Schools and  questions: Phone Number: [phone] or [phone] and Hours 7 a.m. to 7 p.m.                      Caller verbalizes understanding and denies further questions and will call back if further symptoms to triage or questions  . Danika Negron RN  - [facility] Nurse Advisor     Reason for Disposition    Face becomes swollen    Protocols used: RASH OR REDNESS - WIDESPREAD-A-OH

## 2021-06-13 NOTE — PROGRESS NOTES
SUBJECTIVE:   Chief Complaint   Patient presents with     Rash     Rash in the groin, bleeding      Sore Throat     Slight sore throat X1.5 weeks, hurts to swallow, eyes feel swollen/burning sensation      Orlando Medeiros 52 y.o. male    Current Outpatient Prescriptions   Medication Sig Dispense Refill     clotrimazole 1 % Oint Apply 1 application topically 2 (two) times a day. 1 Tube 0     nystatin (MYCOSTATIN) ointment Apply to affected area twice daily 30 g 0     No current facility-administered medications for this visit.      Allergies: Shellfish containing products   No LMP for male patient.    HPI:   52-year-old male here for evaluation of painful lesion on the head of the penis and rash that is spreading in the groin area.  He has been trying to use over-the-counter clotrimazole cream which she has only been slowing the infection but not per the patient.  He has had problems with this in the buttocks region as well and is here for further evaluation.    Discussed with him the nature of these yeast/fungal infections and the need to keep the area dry as well as possible but also treated with medication and topical coverage to allow for healing.  He is also last few days had a slight sore throat that he want to be evaluated for today as well.  ROS: negative except as per HPI    OBJECTIVE:   The patient appears well, alert, oriented x 3, in no distress.  /68 (Patient Site: Right Arm, Patient Position: Sitting, Cuff Size: Adult Large)  Pulse 76  Temp 98.7  F (37.1  C) (Oral)   Resp 16  Wt 206 lb (93.4 kg)  BMI 30.42 kg/m2    HEENT:  Nose/mouth moist, no erythema/lesions. Neck supple. No adenopathy or thyromegaly. TM's normal BL  Lungs: clear, good air entry, no wheezes, rhonchi or rales.   Cardiac: S1 and S2 normal, no murmurs, regular rate and rhythm.   Abdomen: normal bowel sounds, soft without tenderness, guarding, mass or organomegaly.   Extremities: show no edema, normal peripheral  pulses.  Groin: Signs of tinea infection of the bilateral groin as well as part of the scrotum with balanitis signs on the head and corona of the penis-buttocks region is showing a raw areas of tinea in the bilateral buttocks crease no open lesions or abscesses noted  Neurological: normal, no focal findings.  Skin: clear, dry, no rashes/lesions  Psych- normal mood and affect      ASSESSMENT/PLAN  1. Tinea cruris  Discussed with him keep the area dry  Discussed him starting nystatin ointment twice daily in the area  Contact me in a couple weeks if symptoms are not slowly improving    2. Balanitis  Nystatin topical to be applied to affected area twice daily  If not improving to contact me at clinic    3.  Sore throat  No acute findings on examination today everything in normal range  Discussed with him continue to monitor at this point which is in agreement      Pt states an understanding and agrees to the above plan.

## 2021-06-14 NOTE — PROGRESS NOTES
ASSESSMENT/PLAN  1. Tinea  Area of tinea in the groin area balanitis clearing well with no signs left on clinical examination  Patient like a refill of the topical clotrimazole betamethasone cream in case of flare  Discussed with him infrequent use and try to keep the area dry as possible to avoid recurrence    2. Dermatitis  He has areas of dermatitis in the bilateral lower lateral extremity left greater than right areas of pruritus and small excoriations noted no signs of cellulitis  Discussed topical cortisone use as well as lotion use daily    3. Pain of great toe, left  No acute swelling or signs of infection no signs of gout  Good range of motion  Skin is been present for about 2 weeks  Possibly irritation due to abnormal shoe wear while hunting  With symptoms not present during the day and really only present at night possibility of arthritis discussed with the patient as well  Discussed with the monitoring at this time which is in agreement    4. Health care maintenance  Reviewed patient's previous laboratory values which are greater than 3 years old  Reviewed some he should likely schedule for complete physical and fasting labs in the near future which is in agreement we will set this up in the near future        SUBJECTIVE:   Chief Complaint   Patient presents with     Rash     Recheck rash in groin, new rash on lower legs      Toe Pain     Left great toe pain X1.5 weeks, no known injury      Orlando Medeiros 52 y.o. male    Current Outpatient Prescriptions   Medication Sig Dispense Refill     clotrimazole-betamethasone (LOTRISONE) cream Apply to affected area twice daily 30 g 0     nystatin (MYCOSTATIN) ointment Apply topically 2 (two) times a day. 30 g 2     No current facility-administered medications for this visit.      Allergies: Shellfish containing products   No LMP for male patient.    HPI:   Patient is here for follow-up regards to a rash in the groin, newfound rash and irritation lower  extremities, toe pain, and review of previous laboratory values.  Patient has not been seen for laboratory work in greater than 3 years.  We discussed the need for complete physical and fasting labs which she will set up for the near future.  Patient has been experiencing some left first digit toe pain the last 1-2 weeks since deer hunting.  No acute trauma to his knowledge.  There is no swelling or signs of infection.  There is good range of motion.  He points the area of the phalangeal joint for the most discomfort is.  Discussed the possible irritation due to duration of symptoms right after deer season could be secondary to shoe wear a boot wear-symptoms really bother him really at night discussed with him considering monitoring at this time which is in agreement.  Patient has previously last month for a rash of consistent with tinea and balanitis in his groin use topical clotrimazole betamethasone cream which is clear the area.  On recheck in clinical examination no signs of active infection discussed with him monitor at this time to keep the area as dry as possible.  He would like a refill of medication in case another flare occurs.  He has areas of bilateral lower extremities and lateral aspects of the legs that show signs of dermatitis no vesicles or papules no signs of dermatitis areas that are dry and some excoriations left greater than right.  Discussed topical steroid use lotion use daily to help calm down the areas.    ROS: negative except as per HPI    OBJECTIVE:   The patient appears well, alert, oriented x 3, in no distress.  /70 (Patient Site: Right Arm, Patient Position: Sitting, Cuff Size: Adult Large)  Pulse 96  Temp 98.2  F (36.8  C) (Oral)   Resp 16  Wt 219 lb (99.3 kg)  BMI 32.34 kg/m2    Lungs: clear, good air entry, no wheezes, rhonchi or rales.   Cardiac: S1 and S2 normal, no murmurs, regular rate and rhythm.   Abdomen: normal bowel sounds, soft   Extremities: show no edema,  normal peripheral pulses.   Groin: Areas of tinea is cleared as well as balanitis cleared  Neurological: normal, no focal findings.  Skin: Small excoriations noted in the lateral lower extremities bilaterally left greater than right no signs of cellulitis, no papules or vesicles, dryness of skin noted bilaterally in the lower extremities  Psych- normal mood and affect      Pt states an understanding and agrees to the above plan.  Greater than 25 minutes was spent today in interview and examination with Orlando Medeiros with more than 50% of that time in counseling and coordination of care.

## 2021-06-15 NOTE — PROGRESS NOTES
Assessment:      Healthy male exam.    Balanitis  HCM  Plan:       All questions answered.    Balanitis- has cleared the area well with topical- but it returns- discussed keeping the area dry as well as possible and limited medication use- if recurrence continues to think about having partner checked for yeast infection  HCM-UTD with immunizations and colon cancer screening  Subjective:      Orlando Medeiros is a 52 y.o. male who presents for an annual exam. The patient reports that there is not domestic violence in his life.  Patient is here for yearly physical, fasting labs he is up-to-date on immunizations and colonoscopy screening but continues to have problems with balanitis.  He does have improvement in clearing with topical anti-yeast and steroid medication which we discussed want to limit as much as possible but he does have recurrence.  Discussed with him keeping the area dry as well as possible but the possibility that he can get reinfected that his partner could have yeast infection as well.  Reviewed his previous laboratory values  Discussed goals for him going forward  Discussed diet and exercise    Healthy Habits:   Regular Exercise: Yes  Sunscreen Use: Yes  Healthy Diet: Yes  Dental Visits Regularly: Yes  Seat Belt: Yes  Sexually active: Yes  Colonoscopy: Yes  Lipid Profile: Yes  Glucose Screen: Yes    Immunization History   Administered Date(s) Administered     DT (pediatric) 01/01/1998     Tdap 06/10/2014     Immunization status: up to date and documented.    No exam data present    Current Outpatient Prescriptions   Medication Sig Dispense Refill     clotrimazole-betamethasone (LOTRISONE) cream Apply to affected area twice daily 30 g 0     nystatin (MYCOSTATIN) ointment Apply topically 2 (two) times a day. 30 g 2     No current facility-administered medications for this visit.      Past Medical History:   Diagnosis Date     Diverticulosis      Past Surgical History:   Procedure Laterality Date      "HERNIA REPAIR  2000     Shellfish containing products  Family History   Problem Relation Age of Onset     Parkinsonism Mother      Breast cancer Sister      Stroke Maternal Grandfather      Social History     Social History     Marital status: Single     Spouse name: N/A     Number of children: N/A     Years of education: N/A     Occupational History     Not on file.     Social History Main Topics     Smoking status: Former Smoker     Quit date: 4/20/2015     Smokeless tobacco: Never Used     Alcohol use Not on file     Drug use: Not on file     Sexual activity: Not on file     Other Topics Concern     Not on file     Social History Narrative       Review of Systems  General:  Denies problem  Eyes: Denies problem  Ears/Nose/Throat: Denies problem  Cardiovascular: Denies problem  Respiratory:  Denies problem  Gastrointestinal:  Denies problem  Genitourinary: Denies problem  Musculoskeletal:  Denies problem  Skin: Denies problem  Neurologic: Denies problem  Psychiatric: Denies problem  Endocrine: Denies problem  Heme/Lymphatic: Denies problem   Allergic/Immunologic: Denies problem        Objective:     Vitals:    01/18/18 0827   BP: 108/80   Pulse: 88   Resp: 16   Temp: 98.8  F (37.1  C)   TempSrc: Oral   Weight: 209 lb (94.8 kg)   Height: 5' 9.25\" (1.759 m)     Body mass index is 30.64 kg/(m^2).    Physical  General Appearance: Alert, cooperative, no distress, appears stated age  Head: Normocephalic, without obvious abnormality, atraumatic  Eyes: PERRL, conjunctiva/corneas clear, EOM's intact  Ears: Normal TM's and external ear canals, both ears  Nose: Nares normal, septum midline,mucosa normal, no drainage  Throat: Lips, mucosa, and tongue normal; teeth and gums normal  Neck: Supple, symmetrical, trachea midline, no adenopathy;  thyroid: not enlarged, symmetric, no tenderness/mass/nodules; no carotid bruit or JVD  Back: Symmetric, no curvature, ROM normal, no CVA tenderness  Lungs: Clear to auscultation bilaterally, " respirations unlabored  Heart: Regular rate and rhythm, S1 and S2 normal, no murmur, rub, or gallop,  Abdomen: Soft, non-tender, bowel sounds active all four quadrants,  no masses, no organomegaly  Genitourinary:signs of balanitis  Musculoskeletal: Normal range of motion. No joint swelling or deformity.   Extremities: Extremities normal, atraumatic, no cyanosis or edema  Skin: Skin color, texture, turgor normal, no rashes or lesions  Lymph nodes: Cervical, supraclavicular, and axillary nodes normal  Neurologic: He is alert. He has normal reflexes.   Psychiatric: He has a normal mood and affect.

## 2021-06-16 PROBLEM — L40.9 PSORIASIS: Status: ACTIVE | Noted: 2020-04-28

## 2021-06-18 NOTE — PROGRESS NOTES
ASSESSMENT/PLAN  1. Balanitis  Patient having improvement with combination clotrimazole betamethasone topical cream but not complete resolution  Patient would like to continue with current treatment at this time but has run out of the medication-I offered referral to dermatology which he would like to delay at this point  We will try a combination of nystatin powder along with the Chlortrimazole betamethasone combination cream  If not improving to contact me at clinic and will give him referral to dermatology    2. Generalized OA  Patient has symptoms consistent with osteoarthritis throughout his hand as well as bilateral shoulders  Discussed with him dosing with Tylenol thousand milligrams 2-3 times daily  Discussed with him avoiding activities that would hasten arthritic problems continue with day-to-day normal activity  If symptoms are worsening consideration for steroid injections    3. Plantar fasciitis  Discussed proper shoe wear and inserts  Discussed heel support and arch support  Patient has signs plantar fasciitis on his bilateral feet  He will work on getting better insoles and inserts and work on proper shoe wear  Discussed stretching techniques as well        SUBJECTIVE:   Chief Complaint   Patient presents with     Rash     Recheck rash in groin      Foot Pain     Bilateral heel pain X4 months      Hand Pain     Pain in right hand, knuckles hurt, stiffness in the morning      Shoulder Pain     Bilateral shoulder pain - right is worse      Orlando Medeiros 53 y.o. male    Current Outpatient Prescriptions   Medication Sig Dispense Refill     amoxicillin (AMOXIL) 500 MG capsule Using PRN for dental appointments  0     clotrimazole-betamethasone (LOTRISONE) cream Apply to affected area twice daily 45 g 0     nystatin (MYCOSTATIN) powder Apply twice daily 15 g 0     No current facility-administered medications for this visit.      Allergies: Shellfish containing products   No LMP for male patient.    HPI:    Patient is here with multiple concerns 1 continues to have problems with balanitis-we will try different medications which have improved symptoms but never fully resolved he ran out of medications and symptoms have been worsening  Discussed with him a referral to dermatology for further evaluation which he declined  He like to do another trial of medication that was working prior which is clotrimazole betamethasone combination cream we will add in nystatin powder as well to try to keep the area dry  If not improving patient will contact me will refer to dermatology  He has been having problems with increasing hand stiffness and pain which we discussed is likely osteoarthritis  He is swelling at the DIP joint and MCP joints and Heberden nodules noted  Discussed with him over-the-counter medication such as Tylenol to use on a regular basis to dull the discomfort  He has good range of motion of the joints still but discomfort and stiffness  Is been having bilateral heel and foot pain  He does have an arch that drops when standing we discussed with him proper shoe wear to help prevent plantar fasciitis symptoms  Discussed how plantar fasciitis can lead to heel spurs as well to be painful  He did purchase some inserts from a facility that he feels symptoms might of gotten a little bit worse than after the insert to discuss with them he might have to have an arch or might not be formed well I discussed with him getting new inserts and using proper shoe wear on a regular basis  We discussed stretching techniques    ROS: negative except as per HPI    OBJECTIVE:   The patient appears well, alert, oriented x 3, in no distress.  /80 (Patient Site: Right Arm, Patient Position: Sitting, Cuff Size: Adult Large)  Pulse 68  Temp 98.2  F (36.8  C) (Oral)   Resp 16  Wt 222 lb (100.7 kg)  BMI 32.55 kg/m2    Lungs: clear, good air entry, no wheezes, rhonchi or rales.   Cardiac: S1 and S2 normal, no murmurs, regular rate  and rhythm.   Abdomen: normal bowel sounds, soft   Extremities: show no edema, normal peripheral pulses.  Some swelling of the MCP joints bilateral hands and signs of Heberden nodules on multiple digits-shoulder examination showing good range of motion but some discomfort with internal rotation and abduction away from the body no isolated rotator cuff abnormalities noted  Foot: Patient has an arch that drops when standing  Neurological: normal, no focal findings.  Skin: clear, dry, no rashes/lesions  Psych- normal mood and affect      Pt states an understanding and agrees to the above plan.  Greater than 25 minutes was spent today in interview and examination with Orlando Medeiros with more than 50% of that time in counseling and coordination of care.

## 2021-06-19 NOTE — PROGRESS NOTES
ASSESSMENT/PLAN  1. Balanitis  Patient had improvement with the topical cream and ointments but with ongoing activity and sweating has recurrence  Like a refill of medications sent to his pharmacy    2. Diverticulitis of colon  Patient has a long-standing history of recurrent diverticulitis in the past is been doing well for some time but in the last couple days has been having increasing left lower quadrant abdominal discomfort  He has known diverticulosis in the sigmoid colon  He has been having increasing abdominal discomfort and is here for evaluation  He is not having any fevers but his appetite is down  He has tenderness and guarding in the left lower quadrant  He had a planned in the past 2 be started on antibiotics with similar symptoms as soon as possible and try to avoid imaging and hospitalization  He has been offered surgical removal of the section of bowel in the past but has declined this and has been doing pretty well in the last couple years  We are to start him on antibiotics and discuss options such as Cipro and Flagyl versus Augmentin  He like to do a single antibiotic course with Augmentin full-strength 3 times daily ×14 days  He is to contact us with any worsening symptoms or change in symptoms  Patient is aware of the possibility of infection abscess or perforation and will proceed to the ER if symptoms worsen        SUBJECTIVE:   Chief Complaint   Patient presents with     Diverticulitis     Pt here today to discuss a bout of Diverticulitis with onset of 7/23/18     Medication Management     Would like to discuss a couple of medications on list     Orlando Medeiros 53 y.o. male    Current Outpatient Prescriptions   Medication Sig Dispense Refill     amoxicillin-clavulanate (AUGMENTIN) 875-125 mg per tablet Take 1 tablet by mouth 3 (three) times a day for 14 days. 42 tablet 0     clotrimazole-betamethasone (LOTRISONE) cream Apply to affected area twice daily 45 g 0     nystatin (MYCOSTATIN)  powder Apply twice daily 15 g 0     No current facility-administered medications for this visit.      Allergies: Shellfish containing products   No LMP for male patient.    HPI:   Patient is here for refill medication for his balanitis but more importantly discussion over diverticulitis.  Patient is a history of recurrent diverticulitis in the past and has a known sigmoid area of diverticulosis.  He has had really no problems last couple years but prior to that was having more problems  Overall plan was when there is recurrence of symptoms to try to be seen as soon as possible for evaluation and starting of oral antibiotics to hopefully avoid hospitalization  Patient would like to do a course on Augmentin and try to avoid Flagyl and ciprofloxacin  We discussed the possibility of worsening symptoms and the need to be seen for imaging which he is understanding of  We will start Augmentin 3 times daily ×14 days  We discussed monitor at this time and patient is aware of the need to be seen with any worsening or change of symptoms    ROS: negative except as per HPI    OBJECTIVE:   The patient appears well, alert, oriented x 3, in no distress.  /78 (Patient Site: Right Arm, Patient Position: Sitting, Cuff Size: Adult Large)  Pulse 88  Temp 98.3  F (36.8  C) (Oral)   Resp 16  Wt 219 lb 3.2 oz (99.4 kg)  BMI 32.14 kg/m2    Lungs: clear, good air entry, no wheezes, rhonchi or rales.   Cardiac: S1 and S2 normal, no murmurs, regular rate and rhythm.   Abdomen: normal bowel sounds, soft, tender with some gaurding  Extremities: show no edema, normal peripheral pulses.   Neurological: normal, no focal findings.  Skin: clear, dry, no rashes/lesions  Psych- normal mood and affect      Pt states an understanding and agrees to the above plan.

## 2021-06-20 ENCOUNTER — HEALTH MAINTENANCE LETTER (OUTPATIENT)
Age: 56
End: 2021-06-20

## 2021-06-23 NOTE — PROGRESS NOTES
ASSESSMENT/PLAN  1. Dermatitis  She continues to have problems with dermatitis  We have talked multiple topical steroids and antifungal creams in the past  He shows me 2 new skin lesions which are raised plaque-like lesions on his chest and anterior shin  Discussed with him the possibility of psoriasis and will preparations I would refer him to dermatology for further evaluation  - Ambulatory referral to Dermatology    2. Lung nodule  Patient has a history of lung nodules and previous imaging 5 years ago showed stability  Patient feels he is been more reactive to surrounding smoke and is concerned about these nodules and would like to further follow-up for comparison  - CT Chest Without Contrast; Future    3. Chronic right shoulder pain  Patient has long-standing chronic right shoulder pain  He states he has had problems with it for many years after he dislocated it in the service  He has pain daily-I discussed with him x-ray imaging for evaluation  X-ray imaging showing arthritic changes in the shoulder no acute injury but suspicious for soft tissue injury such as a cartilage tear or tendinopathy  [facility]  Could consider physical therapy and injection on the shoulder  - XR Shoulder Right 2 or More VWS; Future        SUBJECTIVE:   Chief Complaint   Patient presents with     Follow-up     Plantar Fasciitis-Left foot; pt has tried shoe inserts but is still having pain in arch and heel. Recheck lung nodule-CT scan, Balanitis-still has rash, check nails for fungus on hands and feet     Orlando Medeiros 53 y.o. male    No current outpatient medications on file.     No current facility-administered medications for this visit.      Allergies: Shellfish containing products   No LMP for male patient.    HPI:   Patient is here for follow-up regards to a lung nodule.  Patient had this last evaluated about 5 years ago which showed stability from a few years prior-is found himself to be  more reactive when the surrounding people are smoking around him is worried about the changes in the nodule-he like to have a reevaluation of what is been 5 years-we will obtain CT and follow-up based on results    Patient continues to have some problems with foot pain but also some skin lesions couple new lesions that he shows me are raised and plaque-like concerning for possible psoriasis  He continues to have problems with balanitis  He is tired topical steroids and antifungals without improvement  Discussed with him referral to dermatology which he is in agreement    Patient is a long-standing history of right shoulder problems and pain since dislocated it when he was in the service many years ago  He is [facility] at this time but has not had any imaging  We obtained x-ray imaging today showing arthritic changes acute bony abnormality other than some bony overgrowth  Discussed with him is not a thorough evaluation for possible soft tissue injury  Possibility of physical therapy and injection was discussed with the patient      ROS: negative except as per HPI    OBJECTIVE:   The patient appears well, alert, oriented x 3, in no distress.  /73 (Patient Site: Left Arm, Patient Position: Sitting, Cuff Size: Adult Large)   Pulse 76   Temp 97.8  F (36.6  C) (Oral)   Resp 20   Wt 222 lb (100.7 kg)   BMI 32.55 kg/m      Lungs: clear, good air entry, no wheezes, rhonchi or rales.   Cardiac: S1 and S2 normal, no murmurs, regular rate and rhythm.   Abdomen: normal bowel sounds, soft   Extremities: show no edema, normal peripheral pulses.  Full range of motion right upper extremity but with pain-patient notes some clicking when he moves his shoulder  Neurological: normal, no focal findings.  Skin: 5 mm circular raised lesion in the anterior chest between his pectoralis musculature with some centralized clearing and a 4 mm circular raised plaque lesion anterior left shin  Psych- normal mood and  affect      Pt states an understanding and agrees to the above plan.  Greater than 25 minutes was spent today in interview and examination with Orlando Medeiros with more than 50% of that time in counseling and coordination of care.

## 2021-06-23 NOTE — PATIENT INSTRUCTIONS - HE
- Pt has been using anti-fungal and steroids on skin without improvement for some time now  -now with some joint involvement, palque appearing lesions- brother has auto-immune as well- concerned for psoriasis

## 2021-06-27 NOTE — PROGRESS NOTES
Progress Notes by Chon Marino DO at 12/23/2018  8:30 AM     Author: Chon Marino DO Service: -- Author Type: Physician    Filed: 12/26/2018  7:37 AM Encounter Date: 12/23/2018 Status: Signed    : Chon Marino DO (Physician)       Chief Complaint   Patient presents with   ? Abdominal Pain     started last night, diverticulitis flare up, would like augmentin      History of Present Illness: Rooming staff notes reviewed. Patient had good treatment success for diverticulitis using Augmentin in the past.  Patient has had pain in LLQ since yesterday afternoon. He had a loose BM yesterday. No recent fever or chills.     Review of systems: See history of present illness, all others negative.     Current Outpatient Medications   Medication Sig Dispense Refill   ? amoxicillin-clavulanate (AUGMENTIN) 875-125 mg per tablet Take 1 tablet by mouth 2 (two) times a day for 10 days. 20 tablet 0   ? clotrimazole-betamethasone (LOTRISONE) cream Apply to affected area twice daily 45 g 0   ? nystatin (MYCOSTATIN) powder Apply twice daily 15 g 0     No current facility-administered medications for this visit.      Past Medical History:   Diagnosis Date   ? Diverticulosis       Past Surgical History:   Procedure Laterality Date   ? HERNIA REPAIR  2000      Social History     Tobacco Use   ? Smoking status: Former Smoker     Last attempt to quit: 4/20/2015     Years since quitting: 3.6   ? Smokeless tobacco: Never Used   Substance Use Topics   ? Alcohol use: Not on file   ? Drug use: Not on file        Family History   Problem Relation Age of Onset   ? Parkinsonism Mother    ? Breast cancer Sister    ? Stroke Maternal Grandfather        Vitals:    12/23/18 0848   BP: 120/82   Patient Site: Right Arm   Patient Position: Sitting   Cuff Size: Adult Large   Pulse: 94   Resp: 16   Temp: 98.5  F (36.9  C)   TempSrc: Oral   SpO2: 97%   Weight: 215 lb 6.4 oz (97.7 kg)       EXAM:   General: Vital signs reviewed. Patient is in some  distress due to lower abdominal discomfort with standing and movement. Breathing is non labored appearing. Patient is alert and oriented x 3.   Heart: Normal rate and rhythm without murmur  Lungs: Clear to auscultation with good air flow bilaterally.  Back: No areas of tenderness.  Abdomen soft, there is tenderness in the left lower quadrant in the central region, and towards the midline, no abnormal masses or organomegally. Normal bowel sounds.  Skin: Skin is warm and dry without any rash noted.  Patient did not think he would need narcotic pain reliever medication prescribed.  Assessment/Plan   1. Diverticulitis  amoxicillin-clavulanate (AUGMENTIN) 875-125 mg per tablet       Patient Instructions     Be seen again or call clinic in 2 days if symptoms are not better, sooner if feeling any worse.      Patient Education     Discharge Instructions for Diverticulitis  You have been diagnosed with diverticulitis. This is a condition in which small pouches form in your colon (large intestine) and become inflamed or infected. Follow the guidelines below for home care.  As you recover  Tips for recovery include:    Eat a low-fiber diet. Your healthcare provider may advise a liquid diet. This gives your bowel a chance to rest so that it can recover.    Foods to include: flake cereal, mashed potatoes, pancakes, waffles, pasta, white bread, rice, applesauce, bananas, eggs, fish, poultry, tofu, and well-cooked vegetables    Take your medicines as directed. Do not stop taking the medicines, even if you feel better.    Monitor your temperature and report any rising temperature to your healthcare provider.    Take antibiotics exactly as directed. Do not miss any and keep taking them even if you feel better.     Drink 6 to 8 glasses of water every day, unless directed otherwise.    Use a heating pad or hot water bottle to reduce abdominal cramping or pain.  Preventing diverticulitis in the future  Tips for prevention  include:    Eat a high-fiber diet. Fiber adds bulk to the stool so that it passes through the large intestine more easily.    Keep drinking 6 to 8 glasses of water every day, unless directed otherwise.    Begin an exercise program. Ask your healthcare provider how to get started. You can benefit from simple activities such as walking or gardening.    Treat diarrhea with a bland diet. Start with liquids only, then slowly add fiber over time.    Watch for changes in your bowel movements (constipation to diarrhea).    Avoid constipation with fiber and add a stool softener if needed.     Get plenty of rest and sleep.  Follow-up care  Make a follow-up appointment as directed by our staff.  When to call your healthcare provider  Call your healthcare provider immediately if you have any of the following:    Fever of 100.4 F (38.0 C) or higher, or as directed by your healthcare provider    Chills    Severe cramps in the belly, most commonly the lower left side    Tenderness in the belly, most commonly the lower left side    Nausea and vomiting    Bleeding from your rectum   Date Last Reviewed: 7/1/2016 2000-2017 [employer]. [address] [place] [address], [place] [address]. All rights reserved. This information is not intended as a substitute for professional medical care. Always follow your healthcare professional's instructions.              Chon Marino,

## 2021-10-11 ENCOUNTER — HEALTH MAINTENANCE LETTER (OUTPATIENT)
Age: 56
End: 2021-10-11

## 2022-07-17 ENCOUNTER — HEALTH MAINTENANCE LETTER (OUTPATIENT)
Age: 57
End: 2022-07-17

## 2022-09-25 ENCOUNTER — HEALTH MAINTENANCE LETTER (OUTPATIENT)
Age: 57
End: 2022-09-25

## 2023-08-05 ENCOUNTER — HEALTH MAINTENANCE LETTER (OUTPATIENT)
Age: 58
End: 2023-08-05

## 2023-10-31 ENCOUNTER — APPOINTMENT (OUTPATIENT)
Dept: CT IMAGING | Facility: HOSPITAL | Age: 58
End: 2023-10-31
Attending: EMERGENCY MEDICINE
Payer: COMMERCIAL

## 2023-10-31 ENCOUNTER — HOSPITAL ENCOUNTER (EMERGENCY)
Facility: HOSPITAL | Age: 58
Discharge: HOME | End: 2023-10-31
Attending: EMERGENCY MEDICINE | Admitting: EMERGENCY MEDICINE
Payer: COMMERCIAL

## 2023-10-31 VITALS
HEIGHT: 69 IN | SYSTOLIC BLOOD PRESSURE: 165 MMHG | RESPIRATION RATE: 18 BRPM | OXYGEN SATURATION: 98 % | TEMPERATURE: 97.9 F | WEIGHT: 215 LBS | BODY MASS INDEX: 31.84 KG/M2 | HEART RATE: 100 BPM | DIASTOLIC BLOOD PRESSURE: 94 MMHG

## 2023-10-31 DIAGNOSIS — R07.9 CHEST PAIN, UNSPECIFIED TYPE: ICD-10-CM

## 2023-10-31 LAB
ANION GAP SERPL CALCULATED.3IONS-SCNC: 13 MMOL/L (ref 7–15)
BASOPHILS # BLD AUTO: 0 10E3/UL (ref 0–0.2)
BASOPHILS NFR BLD AUTO: 1 %
BUN SERPL-MCNC: 15.5 MG/DL (ref 6–20)
CALCIUM SERPL-MCNC: 10 MG/DL (ref 8.6–10)
CHLORIDE SERPL-SCNC: 98 MMOL/L (ref 98–107)
CREAT SERPL-MCNC: 1.2 MG/DL (ref 0.67–1.17)
DEPRECATED HCO3 PLAS-SCNC: 27 MMOL/L (ref 22–29)
EGFRCR SERPLBLD CKD-EPI 2021: 71 ML/MIN/1.73M2
EOSINOPHIL # BLD AUTO: 0.1 10E3/UL (ref 0–0.7)
EOSINOPHIL NFR BLD AUTO: 1 %
ERYTHROCYTE [DISTWIDTH] IN BLOOD BY AUTOMATED COUNT: 11.8 % (ref 10–15)
FLUAV RNA SPEC QL NAA+PROBE: NEGATIVE
FLUBV RNA RESP QL NAA+PROBE: NEGATIVE
GLUCOSE SERPL-MCNC: 98 MG/DL (ref 70–99)
HCT VFR BLD AUTO: 44.2 % (ref 40–53)
HGB BLD-MCNC: 15.6 G/DL (ref 13.3–17.7)
HOLD SPECIMEN: NORMAL
HOLD SPECIMEN: NORMAL
IMM GRANULOCYTES # BLD: 0 10E3/UL
IMM GRANULOCYTES NFR BLD: 0 %
LYMPHOCYTES # BLD AUTO: 1.6 10E3/UL (ref 0.8–5.3)
LYMPHOCYTES NFR BLD AUTO: 27 %
MAGNESIUM SERPL-MCNC: 1.8 MG/DL (ref 1.7–2.3)
MCH RBC QN AUTO: 32.8 PG (ref 26.5–33)
MCHC RBC AUTO-ENTMCNC: 35.3 G/DL (ref 31.5–36.5)
MCV RBC AUTO: 93 FL (ref 78–100)
MONOCYTES # BLD AUTO: 0.4 10E3/UL (ref 0–1.3)
MONOCYTES NFR BLD AUTO: 7 %
NEUTROPHILS # BLD AUTO: 4 10E3/UL (ref 1.6–8.3)
NEUTROPHILS NFR BLD AUTO: 64 %
NRBC # BLD AUTO: 0 10E3/UL
NRBC BLD AUTO-RTO: 0 /100
NT-PROBNP SERPL-MCNC: <36 PG/ML (ref 0–900)
PLATELET # BLD AUTO: 188 10E3/UL (ref 150–450)
POTASSIUM SERPL-SCNC: 4.1 MMOL/L (ref 3.4–5.3)
RBC # BLD AUTO: 4.75 10E6/UL (ref 4.4–5.9)
RSV RNA SPEC NAA+PROBE: NEGATIVE
SARS-COV-2 RNA RESP QL NAA+PROBE: NEGATIVE
SODIUM SERPL-SCNC: 138 MMOL/L (ref 135–145)
TROPONIN T SERPL HS-MCNC: 10 NG/L
TROPONIN T SERPL HS-MCNC: 9 NG/L
TSH SERPL DL<=0.005 MIU/L-ACNC: 1.44 UIU/ML (ref 0.3–4.2)
WBC # BLD AUTO: 6.1 10E3/UL (ref 4–11)

## 2023-10-31 PROCEDURE — 84484 ASSAY OF TROPONIN QUANT: CPT | Performed by: EMERGENCY MEDICINE

## 2023-10-31 PROCEDURE — 84484 ASSAY OF TROPONIN QUANT: CPT | Performed by: STUDENT IN AN ORGANIZED HEALTH CARE EDUCATION/TRAINING PROGRAM

## 2023-10-31 PROCEDURE — 85025 COMPLETE CBC W/AUTO DIFF WBC: CPT | Performed by: STUDENT IN AN ORGANIZED HEALTH CARE EDUCATION/TRAINING PROGRAM

## 2023-10-31 PROCEDURE — 36415 COLL VENOUS BLD VENIPUNCTURE: CPT | Performed by: STUDENT IN AN ORGANIZED HEALTH CARE EDUCATION/TRAINING PROGRAM

## 2023-10-31 PROCEDURE — 84443 ASSAY THYROID STIM HORMONE: CPT | Performed by: EMERGENCY MEDICINE

## 2023-10-31 PROCEDURE — 82374 ASSAY BLOOD CARBON DIOXIDE: CPT | Performed by: STUDENT IN AN ORGANIZED HEALTH CARE EDUCATION/TRAINING PROGRAM

## 2023-10-31 PROCEDURE — 82310 ASSAY OF CALCIUM: CPT | Performed by: STUDENT IN AN ORGANIZED HEALTH CARE EDUCATION/TRAINING PROGRAM

## 2023-10-31 PROCEDURE — 93005 ELECTROCARDIOGRAM TRACING: CPT | Performed by: EMERGENCY MEDICINE

## 2023-10-31 PROCEDURE — 83880 ASSAY OF NATRIURETIC PEPTIDE: CPT | Performed by: EMERGENCY MEDICINE

## 2023-10-31 PROCEDURE — 250N000011 HC RX IP 250 OP 636: Mod: JZ | Performed by: EMERGENCY MEDICINE

## 2023-10-31 PROCEDURE — 99285 EMERGENCY DEPT VISIT HI MDM: CPT | Mod: 25

## 2023-10-31 PROCEDURE — 36415 COLL VENOUS BLD VENIPUNCTURE: CPT | Performed by: EMERGENCY MEDICINE

## 2023-10-31 PROCEDURE — 83735 ASSAY OF MAGNESIUM: CPT | Performed by: EMERGENCY MEDICINE

## 2023-10-31 PROCEDURE — 71275 CT ANGIOGRAPHY CHEST: CPT

## 2023-10-31 PROCEDURE — 87637 SARSCOV2&INF A&B&RSV AMP PRB: CPT | Performed by: EMERGENCY MEDICINE

## 2023-10-31 RX ORDER — IOPAMIDOL 755 MG/ML
75 INJECTION, SOLUTION INTRAVASCULAR ONCE
Status: COMPLETED | OUTPATIENT
Start: 2023-10-31 | End: 2023-10-31

## 2023-10-31 RX ADMIN — IOPAMIDOL 75 ML: 755 INJECTION, SOLUTION INTRAVENOUS at 19:04

## 2023-10-31 ASSESSMENT — HEART SCORE
HISTORY: MODERATELY SUSPICIOUS
AGE: 45-64
ECG: NORMAL
TROPONIN: LESS THAN OR EQUAL TO NORMAL LIMIT
RISK FACTORS: NO KNOWN RISK FACTORS
HEART SCORE: 2

## 2023-10-31 NOTE — ED TRIAGE NOTES
Pt ambulatory to triage c/o intermittent left-sided CP the past couple days along with tachycardia at times, with HR between 100-119 (according to his watch). Pt reports slight nausea, lightheadedness with position changes to standing and tingling to his face. Pt also reports he started a diet last week and has lost 7 lbs.   Denies SOB, cough or fever.

## 2023-10-31 NOTE — ED PROVIDER NOTES
EMERGENCY DEPARTMENT ENCOUNTER      NAME: Orlando Medeiros  AGE: 58 year old male  YOB: 1965  MRN: [id number]  EVALUATION DATE & TIME: No admission date for patient encounter.    PCP: Benedict Holly    ED PROVIDER: Jossie Rush M.D.      Chief Complaint   Patient presents with    Chest Pain         FINAL IMPRESSION:  1. Chest pain, unspecified type          ED COURSE & MEDICAL DECISION MAKIN:12 PM Met with patient for initial interview and exam in the waiting room.      ED Course as of 10/31/23 1937   Tue Oct 31, 2023   1620 Pt with atypical elevation of HR into low 100s for last 4-5 days worse with exertion without SOB, had chest pain but resolved, HEART score 2 low risk, EKG reassuring and sinus rhtyhm without afib or PVCs or ST abnormalities, TSH and chemistry pending with CBC and troponin, CTA r/o PE with PERC+ and wells PE moderate risk pretest probability for acute PE, pt ok with plan   1803 Initial troponin 10 at 1609, repeat 2h delta troponin ordered 6:03 PM and charge MARYCHUY De Santiago requesting draw in waiting room, awaiting CT    Troponin 10 -> 9 thus per high sensitivity troponin screening protocol, ACS is ruled out. Viral PCR negative, NT pro BNP undetectable thus no fluid retention, TSH and electrolytes WNLA nd CBC normal.     CTA chest negative for acute pathology. Patient with discomfort resolved, feels well, neurovascularly intact with improved left face paresthesia with no loss of sensation and resolved chest pain. Patient discharged after being provided with extensive anticipatory guidance and given return precautions, importance of PMD follow-up emphasized.        Pertinent Labs & Imaging studies reviewed. (See chart for details)    N95 worn  A face shield was worn also  COVID PPE    Medical Decision Making    History:  Supplemental history from: N/A  External Record(s) reviewed: Documented in chart, if applicable.    Work Up:  Chart documentation includes differential  considered and any EKGs or imaging independently interpreted by provider, where specified.  In additional to work up documented, I considered the following work up: Documented in chart, if applicable.    External consultation:  Discussion of management with another provider: Documented in chart, if applicable    Complicating factors:  Care impacted by chronic illness: N/A  Care affected by social determinants of health: N/A    Disposition considerations: Discharge. No recommendations on prescription strength medication(s). I considered admission, but discharged patient after significant clinical improvement.        At the conclusion of the encounter I discussed the results of all of the tests and the disposition. The questions were answered. The patient or family acknowledged understanding and was agreeable with the care plan.     MEDICATIONS GIVEN IN THE EMERGENCY:  Medications   iopamidol (ISOVUE-370) solution 75 mL (75 mLs Intravenous $Given 10/31/23 1904)       NEW PRESCRIPTIONS STARTED AT TODAY'S ER VISIT  New Prescriptions    No medications on file          =================================================================    HPI      Orlando Medeiros is a 58 year old male with no contributory PMHx who presents to the ED today via walk in with chest pain and palpitations.    The patient reports 4-5 days of increased heart rate, lightheadedness, and left-sided facial sensation changes without loss of sensation. He endorses chest pain earlier today that has been getting better. He states that he can feel his heart beating faster and it ranges from 90-130s while he is resting. When he is exercising, it goes as high as 146. The lightheadedness is present when he stands up.     He additionally reports circulation issues, stating that his hands have been falling asleep easily within the same time frame as the palpitations.     The patient states that his father recently had a MI.     He denies a history of smoking, a  "family history of MI at a young age, and a both family or personal history of blood clot. He denies a personal history of abnormal heart rhythm. He denies cough, fever, recent surgery, leg edema, and being on a blood thinner.    He states that he started a diet 1 week ago and has lost 7 pounds in that time.    REVIEW OF SYSTEMS   All other systems reviewed and are negative except as noted above in HPI.    PAST MEDICAL HISTORY:  Past Medical History:   Diagnosis Date    Diverticulosis        PAST SURGICAL HISTORY:  Past Surgical History:   Procedure Laterality Date    HERNIA REPAIR         CURRENT MEDICATIONS:    atorvastatin (LIPITOR) 20 MG tablet  calcipotriene (DOVONOX) 0.005 % ointment  clotrimazole (LOTRIMIN) 1 % cream  desonide (DESOWEN) 0.05 % cream  tacrolimus (PROTOPIC) 0.03 % ointment        ALLERGIES:  Allergies   Allergen Reactions    Shellfish Containing Products [Shellfish-Derived Products] Hives       FAMILY HISTORY:  Family History   Problem Relation Age of Onset    Parkinsonism Mother     Breast Cancer Sister     Cerebrovascular Disease Maternal Grandfather        SOCIAL HISTORY:   Social History     Socioeconomic History    Marital status: Single   Tobacco Use    Smoking status: Former     Types: Cigarettes     Quit date: 2015     Years since quittin.5    Smokeless tobacco: Never       VITALS:  Patient Vitals for the past 24 hrs:   BP Temp Temp src Pulse Resp SpO2 Height Weight   10/31/23 1557 (!) 143/97 97.9  F (36.6  C) Oral 103 20 98 % -- --   10/31/23 1552 -- -- -- -- -- -- 1.753 m (5' 9\") 97.5 kg (215 lb)       PHYSICAL EXAM    GENERAL: Awake, alert.  In no acute distress.   HEENT: Normocephalic, atraumatic.  Pupils equal, round and reactive.  Conjunctiva normal.  EOMI.  NECK: No stridor or apparent deformity.  PULMONARY: Symmetrical breath sounds without distress.  Lungs clear to auscultation bilaterally without wheezes, rhonchi or rales.  CARDIO: Rapid, regular rate. No " significant murmur, rub or gallop.  Radial pulses strong and symmetrical.  ABDOMINAL: Abdomen soft, non-distended and non-tender to palpation.  No CVAT, no palpable hepatosplenomegaly.  EXTREMITIES: No lower extremity swelling or edema.    NEURO: Alert and oriented to person, place and time.  Cranial nerves grossly intact.  No focal motor deficit.  PSYCH: Normal mood and affect  SKIN: No rashes      LAB:  All pertinent labs reviewed and interpreted.  Results for orders placed or performed during the hospital encounter of 10/31/23   CT Chest Pulmonary Embolism w Contrast    Impression    IMPRESSION:  1.  Negative CTA chest.   Basic metabolic panel   Result Value Ref Range    Sodium 138 135 - 145 mmol/L    Potassium 4.1 3.4 - 5.3 mmol/L    Chloride 98 98 - 107 mmol/L    Carbon Dioxide (CO2) 27 22 - 29 mmol/L    Anion Gap 13 7 - 15 mmol/L    Urea Nitrogen 15.5 6.0 - 20.0 mg/dL    Creatinine 1.20 (H) 0.67 - 1.17 mg/dL    GFR Estimate 71 >60 mL/min/1.73m2    Calcium 10.0 8.6 - 10.0 mg/dL    Glucose 98 70 - 99 mg/dL   Result Value Ref Range    Troponin T, High Sensitivity 10 <=22 ng/L   CBC with platelets and differential   Result Value Ref Range    WBC Count 6.1 4.0 - 11.0 10e3/uL    RBC Count 4.75 4.40 - 5.90 10e6/uL    Hemoglobin 15.6 13.3 - 17.7 g/dL    Hematocrit 44.2 40.0 - 53.0 %    MCV 93 78 - 100 fL    MCH 32.8 26.5 - 33.0 pg    MCHC 35.3 31.5 - 36.5 g/dL    RDW 11.8 10.0 - 15.0 %    Platelet Count 188 150 - 450 10e3/uL    % Neutrophils 64 %    % Lymphocytes 27 %    % Monocytes 7 %    % Eosinophils 1 %    % Basophils 1 %    % Immature Granulocytes 0 %    NRBCs per 100 WBC 0 <1 /100    Absolute Neutrophils 4.0 1.6 - 8.3 10e3/uL    Absolute Lymphocytes 1.6 0.8 - 5.3 10e3/uL    Absolute Monocytes 0.4 0.0 - 1.3 10e3/uL    Absolute Eosinophils 0.1 0.0 - 0.7 10e3/uL    Absolute Basophils 0.0 0.0 - 0.2 10e3/uL    Absolute Immature Granulocytes 0.0 <=0.4 10e3/uL    Absolute NRBCs 0.0 10e3/uL   Extra Blue Top Tube    Result Value Ref Range    Hold Specimen JIC    Extra Red Top Tube   Result Value Ref Range    Hold Specimen JIC    TSH with free T4 reflex   Result Value Ref Range    TSH 1.44 0.30 - 4.20 uIU/mL   Result Value Ref Range    Magnesium 1.8 1.7 - 2.3 mg/dL   Nt probnp inpatient (BNP)   Result Value Ref Range    N terminal Pro BNP Inpatient <36 0 - 900 pg/mL   Symptomatic Influenza A/B, RSV, & SARS-CoV2 PCR (COVID-19) Nasopharyngeal    Specimen: Nasopharyngeal; Swab   Result Value Ref Range    Influenza A PCR Negative Negative    Influenza B PCR Negative Negative    RSV PCR Negative Negative    SARS CoV2 PCR Negative Negative   Result Value Ref Range    Troponin T, High Sensitivity 9 <=22 ng/L       RADIOLOGY:  Reviewed all pertinent imaging. Please see official radiology report.  CT Chest Pulmonary Embolism w Contrast   Final Result   IMPRESSION:   1.  Negative CTA chest.            EKG:    Reviewed and interpreted as:   Performed at: 15:51:09, reviewed at 15:59    Impression: Sinus tachycardia, otherwise normal ECG.  Rate: 102 BPM    Comparison: No prior EKG for comparison      I have independently reviewed and interpreted the EKG(s) documented above.        I, Samuel Buenrostro, am serving as a scribe to document services personally performed by Dr. Jossie Rush based on my observation and the provider's statements to me. I, Jossie Rush MD attest that Samuel Buenrostro is acting in a scribe capacity, has observed my performance of the services and has documented them in accordance with my direction.       Jossie Rush MD  10/31/23 1937

## 2023-11-02 LAB
ATRIAL RATE - MUSE: 102 BPM
DIASTOLIC BLOOD PRESSURE - MUSE: NORMAL MMHG
INTERPRETATION ECG - MUSE: NORMAL
P AXIS - MUSE: 49 DEGREES
PR INTERVAL - MUSE: 172 MS
QRS DURATION - MUSE: 80 MS
QT - MUSE: 364 MS
QTC - MUSE: 474 MS
R AXIS - MUSE: 11 DEGREES
SYSTOLIC BLOOD PRESSURE - MUSE: NORMAL MMHG
T AXIS - MUSE: 28 DEGREES
VENTRICULAR RATE- MUSE: 102 BPM

## 2024-09-28 ENCOUNTER — HEALTH MAINTENANCE LETTER (OUTPATIENT)
Age: 59
End: 2024-09-28

## 2025-04-12 ENCOUNTER — APPOINTMENT (OUTPATIENT)
Dept: CT IMAGING | Facility: CLINIC | Age: 60
End: 2025-04-12
Attending: EMERGENCY MEDICINE
Payer: COMMERCIAL

## 2025-04-12 ENCOUNTER — HOSPITAL ENCOUNTER (EMERGENCY)
Facility: CLINIC | Age: 60
Discharge: HOME | End: 2025-04-12
Attending: EMERGENCY MEDICINE | Admitting: EMERGENCY MEDICINE
Payer: COMMERCIAL

## 2025-04-12 ENCOUNTER — APPOINTMENT (OUTPATIENT)
Dept: MRI IMAGING | Facility: CLINIC | Age: 60
End: 2025-04-12
Attending: EMERGENCY MEDICINE
Payer: COMMERCIAL

## 2025-04-12 VITALS
SYSTOLIC BLOOD PRESSURE: 149 MMHG | OXYGEN SATURATION: 98 % | RESPIRATION RATE: 18 BRPM | DIASTOLIC BLOOD PRESSURE: 88 MMHG | TEMPERATURE: 98.4 F | HEART RATE: 104 BPM

## 2025-04-12 DIAGNOSIS — R93.0 ABNORMAL HEAD CT: ICD-10-CM

## 2025-04-12 DIAGNOSIS — R90.89 ABNORMAL BRAIN MRI: ICD-10-CM

## 2025-04-12 DIAGNOSIS — Z86.79 HISTORY OF HYPERTENSION: ICD-10-CM

## 2025-04-12 DIAGNOSIS — R93.89 ABNORMAL COMPUTED TOMOGRAPHY ANGIOGRAPHY (CTA): ICD-10-CM

## 2025-04-12 PROBLEM — R09.89: Status: ACTIVE | Noted: 2025-04-12

## 2025-04-12 LAB
ANION GAP SERPL CALCULATED.3IONS-SCNC: 13 MMOL/L (ref 7–15)
APTT PPP: 27 SECONDS (ref 22–38)
BASOPHILS # BLD AUTO: 0 10E3/UL (ref 0–0.2)
BASOPHILS NFR BLD AUTO: 0 %
BUN SERPL-MCNC: 14.1 MG/DL (ref 6–20)
CALCIUM SERPL-MCNC: 9.5 MG/DL (ref 8.8–10.4)
CHLORIDE SERPL-SCNC: 99 MMOL/L (ref 98–107)
CREAT SERPL-MCNC: 1.27 MG/DL (ref 0.67–1.17)
EGFRCR SERPLBLD CKD-EPI 2021: 65 ML/MIN/1.73M2
EOSINOPHIL # BLD AUTO: 0.1 10E3/UL (ref 0–0.7)
EOSINOPHIL NFR BLD AUTO: 1 %
ERYTHROCYTE [DISTWIDTH] IN BLOOD BY AUTOMATED COUNT: 11.8 % (ref 10–15)
GLUCOSE BLDC GLUCOMTR-MCNC: 106 MG/DL (ref 70–99)
GLUCOSE SERPL-MCNC: 105 MG/DL (ref 70–99)
HCO3 SERPL-SCNC: 24 MMOL/L (ref 22–29)
HCT VFR BLD AUTO: 42.1 % (ref 40–53)
HGB BLD-MCNC: 15.3 G/DL (ref 13.3–17.7)
HOLD SPECIMEN: NORMAL
IMM GRANULOCYTES # BLD: 0 10E3/UL
IMM GRANULOCYTES NFR BLD: 0 %
INR PPP: 1.04 (ref 0.85–1.15)
LYMPHOCYTES # BLD AUTO: 1.9 10E3/UL (ref 0.8–5.3)
LYMPHOCYTES NFR BLD AUTO: 23 %
MCH RBC QN AUTO: 33.3 PG (ref 26.5–33)
MCHC RBC AUTO-ENTMCNC: 36.3 G/DL (ref 31.5–36.5)
MCV RBC AUTO: 92 FL (ref 78–100)
MONOCYTES # BLD AUTO: 0.7 10E3/UL (ref 0–1.3)
MONOCYTES NFR BLD AUTO: 8 %
NEUTROPHILS # BLD AUTO: 5.6 10E3/UL (ref 1.6–8.3)
NEUTROPHILS NFR BLD AUTO: 68 %
NRBC # BLD AUTO: 0 10E3/UL
NRBC BLD AUTO-RTO: 0 /100
PLATELET # BLD AUTO: 205 10E3/UL (ref 150–450)
POTASSIUM SERPL-SCNC: 3.6 MMOL/L (ref 3.4–5.3)
RBC # BLD AUTO: 4.59 10E6/UL (ref 4.4–5.9)
SODIUM SERPL-SCNC: 136 MMOL/L (ref 135–145)
TROPONIN T SERPL HS-MCNC: 6 NG/L
WBC # BLD AUTO: 8.3 10E3/UL (ref 4–11)

## 2025-04-12 PROCEDURE — 255N000002 HC RX 255 OP 636: Performed by: EMERGENCY MEDICINE

## 2025-04-12 PROCEDURE — 85610 PROTHROMBIN TIME: CPT | Performed by: EMERGENCY MEDICINE

## 2025-04-12 PROCEDURE — 85025 COMPLETE CBC W/AUTO DIFF WBC: CPT | Performed by: EMERGENCY MEDICINE

## 2025-04-12 PROCEDURE — 84484 ASSAY OF TROPONIN QUANT: CPT | Performed by: EMERGENCY MEDICINE

## 2025-04-12 PROCEDURE — G0425 INPT/ED TELECONSULT30: HCPCS | Mod: G0 | Performed by: STUDENT IN AN ORGANIZED HEALTH CARE EDUCATION/TRAINING PROGRAM

## 2025-04-12 PROCEDURE — 82962 GLUCOSE BLOOD TEST: CPT

## 2025-04-12 PROCEDURE — 82435 ASSAY OF BLOOD CHLORIDE: CPT | Performed by: EMERGENCY MEDICINE

## 2025-04-12 PROCEDURE — 250N000011 HC RX IP 250 OP 636: Performed by: EMERGENCY MEDICINE

## 2025-04-12 PROCEDURE — 80048 BASIC METABOLIC PNL TOTAL CA: CPT | Performed by: EMERGENCY MEDICINE

## 2025-04-12 PROCEDURE — 70450 CT HEAD/BRAIN W/O DYE: CPT

## 2025-04-12 PROCEDURE — A9585 GADOBUTROL INJECTION: HCPCS | Performed by: EMERGENCY MEDICINE

## 2025-04-12 PROCEDURE — 85730 THROMBOPLASTIN TIME PARTIAL: CPT | Performed by: EMERGENCY MEDICINE

## 2025-04-12 PROCEDURE — 70496 CT ANGIOGRAPHY HEAD: CPT

## 2025-04-12 PROCEDURE — 36415 COLL VENOUS BLD VENIPUNCTURE: CPT | Performed by: EMERGENCY MEDICINE

## 2025-04-12 PROCEDURE — 70553 MRI BRAIN STEM W/O & W/DYE: CPT

## 2025-04-12 PROCEDURE — 99291 CRITICAL CARE FIRST HOUR: CPT | Performed by: EMERGENCY MEDICINE

## 2025-04-12 PROCEDURE — 93010 ELECTROCARDIOGRAM REPORT: CPT | Performed by: EMERGENCY MEDICINE

## 2025-04-12 PROCEDURE — 250N000009 HC RX 250: Performed by: EMERGENCY MEDICINE

## 2025-04-12 PROCEDURE — 93005 ELECTROCARDIOGRAM TRACING: CPT | Performed by: EMERGENCY MEDICINE

## 2025-04-12 PROCEDURE — 99291 CRITICAL CARE FIRST HOUR: CPT | Mod: 25 | Performed by: EMERGENCY MEDICINE

## 2025-04-12 RX ORDER — CALCIUM CARBONATE 500 MG/1
1000 TABLET, CHEWABLE ORAL 4 TIMES DAILY PRN
Status: CANCELLED | OUTPATIENT
Start: 2025-04-12

## 2025-04-12 RX ORDER — GADOBUTROL 604.72 MG/ML
9.5 INJECTION INTRAVENOUS ONCE
Status: COMPLETED | OUTPATIENT
Start: 2025-04-12 | End: 2025-04-12

## 2025-04-12 RX ORDER — IOPAMIDOL 755 MG/ML
67 INJECTION, SOLUTION INTRAVASCULAR ONCE
Status: COMPLETED | OUTPATIENT
Start: 2025-04-12 | End: 2025-04-12

## 2025-04-12 RX ADMIN — IOPAMIDOL 67 ML: 755 INJECTION, SOLUTION INTRAVENOUS at 18:06

## 2025-04-12 RX ADMIN — GADOBUTROL 9.5 ML: 604.72 INJECTION INTRAVENOUS at 18:41

## 2025-04-12 RX ADMIN — SODIUM CHLORIDE 100 ML: 9 INJECTION, SOLUTION INTRAVENOUS at 18:06

## 2025-04-12 ASSESSMENT — ACTIVITIES OF DAILY LIVING (ADL)
ADLS_ACUITY_SCORE: 41

## 2025-04-12 ASSESSMENT — ENCOUNTER SYMPTOMS
RESPIRATORY NEGATIVE: 1
GASTROINTESTINAL NEGATIVE: 1
CARDIOVASCULAR NEGATIVE: 1
ENDOCRINE NEGATIVE: 1
MUSCULOSKELETAL NEGATIVE: 1
NUMBNESS: 1
HEMATOLOGIC/LYMPHATIC NEGATIVE: 1
ALLERGIC/IMMUNOLOGIC NEGATIVE: 1
PSYCHIATRIC NEGATIVE: 1

## 2025-04-12 ASSESSMENT — COLUMBIA-SUICIDE SEVERITY RATING SCALE - C-SSRS
6. HAVE YOU EVER DONE ANYTHING, STARTED TO DO ANYTHING, OR PREPARED TO DO ANYTHING TO END YOUR LIFE?: NO
1. IN THE PAST MONTH, HAVE YOU WISHED YOU WERE DEAD OR WISHED YOU COULD GO TO SLEEP AND NOT WAKE UP?: NO
2. HAVE YOU ACTUALLY HAD ANY THOUGHTS OF KILLING YOURSELF IN THE PAST MONTH?: NO

## 2025-04-12 NOTE — ED TRIAGE NOTES
Pt comes in for HTN but in triage patient speaks of tingling on left side of face, left arm and left leg. States left eye is blurry. Last known well is 1600 today.     Triage Assessment (Adult)       Row Name 04/12/25 9610          Triage Assessment    Airway WDL WDL        Respiratory WDL    Respiratory WDL WDL        Skin Circulation/Temperature WDL    Skin Circulation/Temperature WDL WDL        Cardiac WDL    Cardiac WDL WDL        Peripheral/Neurovascular WDL    Peripheral Neurovascular WDL X  tingling on left face, left arm and left leg        Cognitive/Neuro/Behavioral WDL    Cognitive/Neuro/Behavioral WDL WDL

## 2025-04-12 NOTE — ED PROVIDER NOTES
History     Chief Complaint   Patient presents with    Stroke Symptoms     HPI  Orlando Medeiros is a 59 year old male who presents with concern about stroke symptoms with report of tingling in the left face left forearm and left  foot that began earlier this afternoon prior to presenting for care.  Patient drove himself from home in [facility] where he lives alone.  He is a retiree who worked in sheet-metal.  He did report that he was recently being evaluated for high blood pressure.   Patient reports no prior history of stroke.  No chest pain or back pain he noted that he has had some numbness of the left temple with slight visual change in the peripheral vision out of the left eye.  No eye pain or pressure.  He was able to get into his car and drive himself here.  No headache.  Due to symptoms reported are unilateral involving the face arm and leg rapid assessment for stroke evaluation was completed.  Patient reports similar episode a week prior involving the right side of his body and that he had an episode where he fell out of bed but did not seek medical care and had no recurrence of symptoms until earlier this afternoon prior to arrival for care.    Allergies:  Allergies   Allergen Reactions    Shellfish Containing Products [Shellfish-Derived Products] Hives       Problem List:    Patient Active Problem List    Diagnosis Date Noted    History of hypertension 04/12/2025     Priority: Medium    Suspected stroke patient last known to be well 2-3 hours ago 04/12/2025     Priority: Medium    Psoriasis 04/28/2020     Priority: Medium    Prepatellar Bursitis      Priority: Medium     Created by Conversion            Past Medical History:    Past Medical History:   Diagnosis Date    Diverticulosis        Past Surgical History:    Past Surgical History:   Procedure Laterality Date    HERNIA REPAIR  2000       Family History:    Family History   Problem Relation Age of Onset    Parkinsonism Mother      Breast Cancer Sister     Cerebrovascular Disease Maternal Grandfather        Social History:  Marital Status:   [4]  Social History     Tobacco Use    Smoking status: Former     Current packs/day: 0.00     Types: Cigarettes     Quit date: 2015     Years since quittin.9    Smokeless tobacco: Never        Medications:    atorvastatin (LIPITOR) 20 MG tablet  calcipotriene (DOVONOX) 0.005 % ointment  clotrimazole (LOTRIMIN) 1 % cream  desonide (DESOWEN) 0.05 % cream  tacrolimus (PROTOPIC) 0.03 % ointment          Review of Systems   HENT: Negative.     Eyes:  Positive for visual disturbance.   Respiratory: Negative.     Cardiovascular: Negative.    Gastrointestinal: Negative.    Endocrine: Negative.    Genitourinary: Negative.    Musculoskeletal: Negative.    Skin: Negative.    Allergic/Immunologic: Negative.    Neurological:  Positive for numbness.   Hematological: Negative.    Psychiatric/Behavioral: Negative.     All other systems reviewed and are negative.      Physical Exam   BP: (!) 183/110  Pulse: 102  Temp: 98.4  F (36.9  C)  Resp: 16  SpO2: 95 %      Physical Exam  Constitutional:       General: He is not in acute distress.     Appearance: Normal appearance. He is not ill-appearing, toxic-appearing or diaphoretic.   HENT:      Head: Normocephalic and atraumatic.      Nose: Nose normal.      Mouth/Throat:      Mouth: Mucous membranes are moist.   Eyes:      Extraocular Movements: Extraocular movements intact.      Pupils: Pupils are equal, round, and reactive to light.   Cardiovascular:      Rate and Rhythm: Normal rate.   Pulmonary:      Effort: Pulmonary effort is normal.      Breath sounds: Normal breath sounds.   Musculoskeletal:         General: No swelling, tenderness, deformity or signs of injury. Normal range of motion.      Cervical back: Normal range of motion and neck supple.      Right lower leg: No edema.      Left lower leg: No edema.   Skin:     Coloration: Skin is not jaundiced.       Findings: No bruising, erythema, lesion or rash.   Neurological:      General: No focal deficit present.      Mental Status: He is alert and oriented to person, place, and time.      Cranial Nerves: No cranial nerve deficit.      Sensory: No sensory deficit.      Motor: No weakness.      Coordination: Coordination normal.      Gait: Gait normal.      Deep Tendon Reflexes: Reflexes normal.   Psychiatric:         Mood and Affect: Mood normal.         Behavior: Behavior normal.         Thought Content: Thought content normal.         Judgment: Judgment normal.         ED Course        Procedures              EKG Interpretation:      Interpreted by Dilip Adrian MD  Time reviewed: 1952  Symptoms at time of EKG: none  Rhythm: normal sinus   Rate: Normal  Axis: Normal  Ectopy: none  Conduction: normal  ST Segments/ T Waves: Non-specific ST-T wave changes  Q Waves: nonspecific  Comparison to prior: When compared to EKG dated 10/31/23-no acute ischemia or arrhythmia.  Nonspecific T wave flattening in inferior leads    Clinical Impression: No acute ischemia arrhythmia, nonspecific T wave changes      Critical Care time:  was 30 minutes for this patient excluding procedures.     None         ED medications:  Medications   iopamidol (ISOVUE-370) solution 67 mL (67 mLs Intravenous $Given 4/12/25 1806)     And   sodium chloride 0.9 % bag for CT scan flush (100 mLs As instructed $Given 4/12/25 1806)   gadobutrol (GADAVIST) injection 9.5 mL (9.5 mLs Intravenous $Given 4/12/25 1841)        ED Vitals:  Vitals:    04/12/25 1800 04/12/25 1930 04/12/25 2000 04/12/25 2030   BP: (!) 158/103 (!) 147/89 (!) 151/89 (!) 149/88   Pulse: 107  104    Resp: 19 18 20 18   Temp:       TempSrc:       SpO2: 96% 98% 97% 98%      ED labs and imaging:  Results for orders placed or performed during the hospital encounter of 04/12/25   CT Head w/o Contrast     Status: None    Narrative    EXAM: CT HEAD W/O CONTRAST, CTA HEAD NECK W  CONTRAST  LOCATION: [facility]  DATE: 4/12/2025    INDICATION: Left sided facial, arm and leg tingling with numbness. LNKW  4pm  Hypertension.  Evaluate for acute intracranial process  COMPARISON: None.  CONTRAST: 67 ml Isovue 370  TECHNIQUE: Head and neck CT angiogram with IV contrast. Noncontrast head CT followed by axial helical CT images of the head and neck vessels obtained during the arterial phase of intravenous contrast administration. Axial 2D reconstructed images and   multiplanar 3D MIP reconstructed images of the head and neck vessels were performed by the technologist. Dose reduction techniques were used. All stenosis measurements made according to NASCET criteria unless otherwise specified.    FINDINGS:   NONCONTRAST HEAD CT:   INTRACRANIAL CONTENTS: Small left frontal parietal subdural hygroma measures up to 5 mm in thickness. Mild flattening of the underlying sulci with no significant midline shift. No acute intracranial hemorrhage.  No CT evidence of acute infarct. Normal   parenchymal attenuation. Normal ventricles and sulci. Normal variant cavum septum pellucidum.    VISUALIZED ORBITS/SINUSES/MASTOIDS: No intraorbital abnormality. No paranasal sinus mucosal disease. No middle ear or mastoid effusion.    BONES/SOFT TISSUES: No acute abnormality.    HEAD CTA:  ANTERIOR CIRCULATION: No stenosis/occlusion, aneurysm, or high flow vascular malformation. Fetal origin of the right posterior cerebral artery from the anterior circulation. Small left posterior communicating artery. 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    POSTERIOR CIRCULATION: No stenosis/occlusion, aneurysm, or high flow vascular malformation. Balanced vertebral arteries supply a normal basilar artery.     DURAL VENOUS SINUSES: Expected enhancement of the major dural venous sinuses.    NECK CTA:  RIGHT  CAROTID: No measurable stenosis or dissection. Tortuosity of the carotid arteries.    LEFT CAROTID: No measurable stenosis or dissection. Tortuosity of the carotid arteries.    VERTEBRAL ARTERIES: Tiny calcified atherosclerotic plaque right vertebral artery origin without hemodynamically significant stenosis. No other stenoses. No dissection. Dominant right and smaller left vertebral arteries.    AORTIC ARCH: Classic aortic arch anatomy with no significant stenosis at the origin of the great vessels.    NONVASCULAR STRUCTURES: Partial fatty replacement of the parotid glands. Degenerative changes cervical spine.      Impression    IMPRESSION:   HEAD CT:  1.  Small left frontal parietal subdural hygroma.  2.  No acute intracranial hemorrhage.    HEAD CTA:  1.  No occlusion, significant stenosis, aneurysm, or high flow vascular malformation identified.  2.  Variant [place] anatomy as above.    NECK CTA:  1.  No hemodynamically significant stenosis in the neck vessels.  2.  No evidence for dissection.  3.  Tortuosity of the carotid arteries can be seen with long-standing hypertension.    Findings discussed with Dr. Adrian at 6:26 PM CST on 04/12/2025.   CTA Head Neck with Contrast     Status: None    Narrative    EXAM: CT HEAD W/O CONTRAST, CTA HEAD NECK W CONTRAST  LOCATION: [facility]  DATE: 4/12/2025    INDICATION: Left sided facial, arm and leg tingling with numbness. LNKW  4pm  Hypertension.  Evaluate for acute intracranial process  COMPARISON: None.  CONTRAST: 67 ml Isovue 370  TECHNIQUE: Head and neck CT angiogram with IV contrast. Noncontrast head CT followed by axial helical CT images of the head and neck vessels obtained during the arterial phase of intravenous contrast administration. Axial 2D reconstructed images and   multiplanar 3D MIP reconstructed images of the head and neck vessels were performed by the technologist. Dose reduction techniques were used. All stenosis  measurements made according to NASCET criteria unless otherwise specified.    FINDINGS:   NONCONTRAST HEAD CT:   INTRACRANIAL CONTENTS: Small left frontal parietal subdural hygroma measures up to 5 mm in thickness. Mild flattening of the underlying sulci with no significant midline shift. No acute intracranial hemorrhage.  No CT evidence of acute infarct. Normal   parenchymal attenuation. Normal ventricles and sulci. Normal variant cavum septum pellucidum.    VISUALIZED ORBITS/SINUSES/MASTOIDS: No intraorbital abnormality. No paranasal sinus mucosal disease. No middle ear or mastoid effusion.    BONES/SOFT TISSUES: No acute abnormality.    HEAD CTA:  ANTERIOR CIRCULATION: No stenosis/occlusion, aneurysm, or high flow vascular malformation. Fetal origin of the right posterior cerebral artery from the anterior circulation. Small left posterior communicating artery. 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    POSTERIOR CIRCULATION: No stenosis/occlusion, aneurysm, or high flow vascular malformation. Balanced vertebral arteries supply a normal basilar artery.     DURAL VENOUS SINUSES: Expected enhancement of the major dural venous sinuses.    NECK CTA:  RIGHT CAROTID: No measurable stenosis or dissection. Tortuosity of the carotid arteries.    LEFT CAROTID: No measurable stenosis or dissection. Tortuosity of the carotid arteries.    VERTEBRAL ARTERIES: Tiny calcified atherosclerotic plaque right vertebral artery origin without hemodynamically significant stenosis. No other stenoses. No dissection. Dominant right and smaller left vertebral arteries.    AORTIC ARCH: Classic aortic arch anatomy with no significant stenosis at the origin of the great vessels.    NONVASCULAR STRUCTURES: Partial fatty replacement of the parotid glands. Degenerative changes cervical spine.      Impression    IMPRESSION:   HEAD CT:  1.   Small left frontal parietal subdural hygroma.  2.  No acute intracranial hemorrhage.    HEAD CTA:  1.  No occlusion, significant stenosis, aneurysm, or high flow vascular malformation identified.  2.  Variant [place] anatomy as above.    NECK CTA:  1.  No hemodynamically significant stenosis in the neck vessels.  2.  No evidence for dissection.  3.  Tortuosity of the carotid arteries can be seen with long-standing hypertension.    Findings discussed with Dr. Adrian at 6:26 PM CST on 04/12/2025.   MR Brain w/o & w Contrast     Status: None    Narrative    EXAM: MR BRAIN W/O and W CONTRAST  LOCATION: [facility]  DATE: 4/12/2025    INDICATION: Left sided facial, arm and leg tingling with numbness. LNKW  4pm Hypertension. Evaluate for acute intracranial process  COMPARISON: CT April 12, 2025  CONTRAST: gadavist 9.5 mL  TECHNIQUE: Routine multiplanar multisequence head MRI without and with intravenous contrast.    FINDINGS:  INTRACRANIAL CONTENTS: No acute or subacute infarct. No acute intracranial hemorrhage or midline shift. Left cerebral convexity extra-axial collection, isointense to CSF, could be seen with subdural hygroma or chronic subdural hematoma. Collection has a   maximum radial width of up to 6 mm. Right cerebral convexity extra-axial collection with questionable subtle FLAIR component posteriorly, indeterminate for subdural hygroma versus chronic subdural hematoma, maximum radial width of up to 2 mm. There is no   acute intracranial hemorrhage. No midline shift.    Scattered nonspecific T2/FLAIR hyperintensities within the cerebral white matter most consistent with mild chronic microvascular ischemic change. Gradient susceptibility in the left periatrial region is compatible with hemosiderin staining from remote   hemorrhage. Increased diffusion signal in this region without ADC correlate is artifactual. Mild generalized cerebral atrophy. No hydrocephalus. Persistent  cavum septum lucidum et vergae. Normal position of the cerebellar tonsils. Mild supratentorial   pachymeningeal enhancement is favored to be reactive secondary to extra-axial collections, less likely intracranial hypotension given lack of additional secondary findings to suggest intracranial hypotension.    SELLA: No abnormality accounting for technique.    OSSEOUS STRUCTURES/SOFT TISSUES: Normal marrow signal. The major intracranial vascular flow voids are maintained.     ORBITS: No abnormality accounting for technique.     SINUSES/MASTOIDS: No paranasal sinus mucosal disease. Mild scattered fluid/membrane thickening in the left mastoid air cells. No apparent mass in the posterior nasopharynx or skull base.       Impression    IMPRESSION:  1.  No acute or subacute ischemic change.  2.  Bilateral extra-axial collections, left greater than right, subdural hygromas versus chronic subdural hematomas.  3.  Mild supratentorial pachymeningeal enhancement is favored to be reactive secondary to extra-axial collections, less likely intracranial hypotension.  4.  Mild age-related changes as above.     [place] Draw     Status: None    Narrative    The following orders were created for panel order [place] Draw.  Procedure                               Abnormality         Status                     ---------                               -----------         ------                     Extra Blue Top Tube[[id number]]                              Final result               Extra Red Top Tube[[id number]]                              Final result               Extra Green Top (Lithiu...[[id number]]                      Final result               Extra Purple Top Tube[[id number]]                           Final result                 Please view results for these tests on the individual orders.   Extra Blue Top Tube     Status: None   Result Value Ref Range    Hold Specimen [facility]    Extra Red Top Tube     Status: None   Result Value Ref  Range    Hold Specimen [facility]    Extra Green Top (Lithium Heparin) Tube     Status: None   Result Value Ref Range    Hold Specimen JI    Extra Purple Top Tube     Status: None   Result Value Ref Range    Hold Specimen [facility]    Basic metabolic panel     Status: Abnormal   Result Value Ref Range    Sodium 136 135 - 145 mmol/L    Potassium 3.6 3.4 - 5.3 mmol/L    Chloride 99 98 - 107 mmol/L    Carbon Dioxide (CO2) 24 22 - 29 mmol/L    Anion Gap 13 7 - 15 mmol/L    Urea Nitrogen 14.1 6.0 - 20.0 mg/dL    Creatinine 1.27 (H) 0.67 - 1.17 mg/dL    GFR Estimate 65 >60 mL/min/1.73m2    Calcium 9.5 8.8 - 10.4 mg/dL    Glucose 105 (H) 70 - 99 mg/dL   INR     Status: Normal   Result Value Ref Range    INR 1.04 0.85 - 1.15   Partial thromboplastin time     Status: Normal   Result Value Ref Range    aPTT 27 22 - 38 Seconds   Troponin T, High Sensitivity     Status: Normal   Result Value Ref Range    Troponin T, High Sensitivity 6 <=22 ng/L   CBC with platelets and differential     Status: Abnormal   Result Value Ref Range    WBC Count 8.3 4.0 - 11.0 10e3/uL    RBC Count 4.59 4.40 - 5.90 10e6/uL    Hemoglobin 15.3 13.3 - 17.7 g/dL    Hematocrit 42.1 40.0 - 53.0 %    MCV 92 78 - 100 fL    MCH 33.3 (H) 26.5 - 33.0 pg    MCHC 36.3 31.5 - 36.5 g/dL    RDW 11.8 10.0 - 15.0 %    Platelet Count 205 150 - 450 10e3/uL    % Neutrophils 68 %    % Lymphocytes 23 %    % Monocytes 8 %    % Eosinophils 1 %    % Basophils 0 %    % Immature Granulocytes 0 %    NRBCs per 100 WBC 0 <1 /100    Absolute Neutrophils 5.6 1.6 - 8.3 10e3/uL    Absolute Lymphocytes 1.9 0.8 - 5.3 10e3/uL    Absolute Monocytes 0.7 0.0 - 1.3 10e3/uL    Absolute Eosinophils 0.1 0.0 - 0.7 10e3/uL    Absolute Basophils 0.0 0.0 - 0.2 10e3/uL    Absolute Immature Granulocytes 0.0 <=0.4 10e3/uL    Absolute NRBCs 0.0 10e3/uL   Glucose by meter     Status: Abnormal   Result Value Ref Range    GLUCOSE BY METER POCT 106 (H) 70 - 99 mg/dL   EKG 12-lead, tracing only     Status: None  (Preliminary result)   Result Value Ref Range    Systolic Blood Pressure  mmHg    Diastolic Blood Pressure  mmHg    Ventricular Rate 99 BPM    Atrial Rate 99 BPM    OH Interval 176 ms    QRS Duration 78 ms     ms    QTc 420 ms    P Axis 32 degrees    R AXIS 0 degrees    T Axis 9 degrees    Interpretation ECG       Sinus rhythm  Nonspecific T wave abnormality  Abnormal ECG  When compared with ECG of 31-Oct-2023 15:51,  Nonspecific T wave abnormality, worse in Inferior leads  QT has shortened     CBC with Platelets & Differential     Status: Abnormal    Narrative    The following orders were created for panel order CBC with Platelets & Differential.  Procedure                               Abnormality         Status                     ---------                               -----------         ------                     CBC with platelets and ...[[id number]]  Abnormal            Final result                 Please view results for these tests on the individual orders.     Assessments & Plan (with Medical Decision Making)   Assessment Summary and clinical Impression: 59-year-old male right hand dominant who arrived from home with concern for unilateral left-sided facial arm and leg tingling and numbness with peripheral visual change without visual loss or eye pain symptoms reported began about 2 hours prior to arriving for care.  Patient did report similar symptoms involving the right side last week with a fall where he fell himself on the floor after waking up from sleep.  No headache on arrival no neck pain no recent neck manipulation.  Recently diagnosed with hypertension but not currently on treatment recently started taking Flomax for BPH.  On rapid assessment on arrival due to unilateral tingling numbness.  There is no focal deficits specifically no extremity weakness,  with normal speech and  GCS of 15.  Given acuity of symptoms that are unilateral and timing of symptoms code stroke activation was  initiated.  After consultation with stroke neurology and completion of neuroimaging- (CT and CTA) plan to admit to medicine for concern for suspected stroke in need of brain   MRI.    MRI brain was obtained revealing no evidence for acute or subacute ischemic changes.  Radiology did note  Bilateral extra-axial collections, left greater than right, subdural hygromas versus chronic subdural hematomas.  Mild supratentorial pachymeningeal enhancement is favored to be reactive secondary to extra-axial collections, less likely intracranial hypotension.  Patient did not wan to be admitted due to his dog and not having close family. with no acute stroke findings of brain MRI.  After further consultation with stroke neurology the recommendation still admission for observation however patient wanted to go home. He was discharged with concern for possible TIA with plan.  He can take a baby aspirin with plan to keep follow-up with his care team as reported blood pressure to return to be reevaluated if symptoms recur or new concerns.  General Neurology referral placed for expedited follow-up if required for possible TIA if not able to be coordinated by [facility].      ED Course and plan:  Reviewed the medical record.  Patient was seen upon arrival due to concern for possible stroke syndrome with report of  unilateral symptoms involving left face left arm and left leg although last known well 4 PM.  He did report similar symptoms a week prior and on the right side of his body.    Stroke activation initiated Tier- 1 . Spoke with Dr Almonte at 6.02pm-stroke neurology service to review patient's symptoms on arrival code stroke activation.  We agreed to neuroimaging and further care.    Spoke with Dr. D Weiland at 6.26pm- reading radiologist.  Head CT revealed concern for a left frontoparietal subdural, with mild mass effect measuring 5 mm without accompanying hemorrhage.  CTA head and neck revealed no LVO. See  additional details outlined in the radiology report.    Spoke with Dr. KRYSTIN Cleveland-stroke service at 6:36 PM who evaluate the patient with plan to admit to medicine for suspected stroke with brain MRI pending No TNK. See stroke neurology consult note for further details.    Spoke with MIGUELINA Jacobo PA-C-  admitting provider at 6.48pm who accepted patient for further care. We reviewed stroke neurology recommendation  and history and presentation with serial examination.     MRI brain revealed no acute or subacute ischemic change. Bilateral extra-axial collections, left greater than right, subdural hygromas versus chronic subdural hematomas. Mild supratentorial pachymeningeal enhancement is favored to be reactive secondary to extra-axial collections, less likely intracranial hypotension.  Mild age-related changes as above.    Patient expressed that he would rather go home with no stroke findings on brain MRI because he has a dog a no close contact or family members   [facility]ed [facility] for recurrent diverticulitis  and on 4/17/25-with his primary care to follow-up on concern for hypertension    Reviewed brain MRI imaging findings with stroke neurology-to seek input about best disposition plan with  outpatient follow-up with patient's expressed need to be discharged rather than be admitted for observation.      Updated the admitting provider at 8.18pm about patient's decision to forego observation admission given no acute stroke findings on brain MRI and plan to follow-up as an outpatient.      Spoke with Dr. Yañez -stroke neurology service at 8.21pm reviewed patient's request to be discharged with the initial plan to admit for observation given possibility of stroke syndrome/TIA.  Prior to completing brain MRI.Dr Yañez was planning to review patient's brain MRI and change in disposition plan per patient request with the consulting staff stroke neurologist and brain MRI findings. If a  change in recommendations he will call me back    Spoke with Dr. Yañez at 8.30pm who reviewed patient's desire to go home against combination plan stroke neurologist advice was for patient treatment for TIA workup.  If he insists on going home general neurology workup for TIA could also be considered     At shift end at 8:32 PM patient wanted to go home reported he had no active numbness of the face arm or leg speech remained normal and his blood pressure at the time of discharge was 151/89.  Placed a general neurology referral to help with facilitating follow up  care for possible TIA workup if not [facility].    Disclaimer: This note consists of symbols derived from keyboarding, dictation and/or voice recognition software. As a result, there may be errors in the script that have gone undetected. Please consider this when interpreting information found in this chart.   I have reviewed the nursing notes.    I have reviewed the findings, diagnosis, plan and need for follow up with the patient.           Medical Decision Making  The patient's presentation was of high complexity (concern for acute neurologic symptoms with stroke symptoms).    The patient's evaluation involved:  ordering and/or review of 3+ test(s) in this encounter (telemetry monitoring, serial neurologic examination, neuroimaging, consultation stroke neurology)    The patient's management necessitated high risk (stroke neurology consultation).        New Prescriptions    No medications on file       Final diagnoses:   Abnormal head CT - IMPRESSION:  HEAD CT: 1.  Small left frontal parietal subdural hygroma   Abnormal computed tomography angiography (CTA) - IMPRESSION-Tortuosity of the carotid arteries can be seen with long-standing hypertension.   Abnormal brain MRI - IMPRESSION: 1.  No acute or subacute ischemic change. 2.  Bilateral extra-axial collections, left greater than right, subdural hygromas versus chronic subdural  hematomas. 3.  Mild supratentorial pachymeningeal enhancement is favored to be reactive secondary to extra-axial collections, less likely intracranial hypotension. 4.  Mild age-related changes as above.   History of hypertension - Recent diagnosis- not currently on antihypertension. Planned followup on 4/17/25 with discussion about initiating antihypertensive       4/12/2025   [facility] EMERGENCY DEPT       Dilip Adiran MD  04/12/25 2035

## 2025-04-12 NOTE — CONSULTS
"[facility]     Stroke Code Note          History of Present Illness     Chief Complaint: Stroke Symptoms      Orlando Medeiros is a 59 year old right-handed male who who presented with sudden onset of left-sided numbness, and left-sided blurry vision.  A week ago he had an episode of syncope when he woke up from the bed, temporarily he lost consciousness and he does not remember how long he was down.  Today around 4:00 in the afternoon he experienced sudden onset of left arm paresthesia, and mild blurry vision to the left side.         Past Medical History     Stroke risk factors: hypertension    Preadmission antithrombotic regimen: dyslipidemia    Modified [place] Score (Pre-morbid)  0-No deficits                   Assessment and Plan       1.  Left arm paresthesia, and left side blurry vision, stroke versus TIA in the right posterior circulation, versus other etiology  2.  An episode of syncope a week ago, explore cardiac etiology  3.  History of dyslipidemia     Intravenous Thrombolysis  Not given due to:   - minor/isolated/quickly resolving symptoms     Endovascular Treatment  Not initiated due to absence of proximal vessel occlusion     Plan:  Admission to the observation for further evaluation  Place inpatient stroke neurology consult  Stroke code de-escalated  MRI of the brain with and without contrast with coronal DWI  Please call us back after the MRI is done for further evaluation     ___________________________________________________________________    The Stroke Staff is Dr. Carnes.    Soniya Almonte MD  Vascular Neurology Fellow    To page me or covering stroke neurology team member, click here: [facility]  Choose \"On Call\" tab at top, then select \"NEUROLOGY/ALL SITES\" from middle drop-down box, press Enter, then look for \"stroke\" or \"telestroke\" for your site.  ___________________________________________________________________        Imaging/Labs   (personally reviewed )    CT " head: No acute pathology  CTA head/neck: No large vessel occlusion  CT Perfusion head: Not applicable         Physical Examination     BP: (!) 158/103   Pulse: 107   Resp: 19   Temp: 98.4  F (36.9  C)   Temp src: Oral   SpO2: 96 %            Wt Readings from Last 2 Encounters:   10/31/23 97.5 kg (215 lb)   01/10/20 102 kg (224 lb 12.8 oz)       Neuro Exam  Mental Status:  alert, oriented x 3, follows commands, speech clear and fluent, naming and repetition normal  Cranial Nerves:  visual fields grossly intact however the patient endorsed some blurry vision on the left side, not appreciated by visual field testing, EOMI with normal smooth pursuit, facial sensation intact and symmetric (tested by nurse), facial movements symmetric, hearing not formally tested but intact to conversation, no dysarthria, shoulder shrug equal bilaterally, tongue protrusion midline  Motor:  no abnormal movements, able to move all limbs antigravity spontaneously with no signs of hemiparesis observed, no pronator drift  Reflexes:  unable to test (telestroke)  Sensory:   Mild paresthesia of the left arm  Coordination:  normal finger-to-nose and heel-to-shin bilaterally without dysmetria, rapid alternating movements symmetric  Station/Gait:  unable to test due to telestroke        Stroke Scales       NIHSS  1a. Level of Consciousness 0-->Alert, keenly responsive   1b. LOC Questions 0-->Answers both questions correctly   1c. LOC Commands 0-->Performs both tasks correctly   2.   Best Gaze 0-->Normal   3.   Visual 0-->No visual loss   4.   Facial Palsy 0-->Normal symmetrical movements   5a. Motor Arm, Left 0-->No drift, limb holds 90 (or 45) degrees for full 10 secs   5b. Motor Arm, Right 0-->No drift, limb holds 90 (or 45) degrees for full 10 secs   6a. Motor Leg, Left 0-->No drift, leg holds 30 degree position for full 5 secs   6b. Motor Leg, right 0-->No drift, leg holds 30 degree position for full 5 secs   7.   Limb Ataxia 0-->Absent   8.    Sensory 1-->Mild-to-moderate sensory loss, patient feels pinprick is less sharp or is dull on the affected side, or there is a loss of superficial pain with pinprick, but patient is aware of being touched   9.   Best Language 0-->No aphasia, normal   10. Dysarthria 0-->Normal   11. Extinction and Inattention  0-->No abnormality   Total 1 (04/12/25 1819)            Labs     CBC  Lab Results   Component Value Date    HGB 15.3 04/12/2025    HCT 42.1 04/12/2025    WBC 8.3 04/12/2025     04/12/2025       BMP  Lab Results   Component Value Date     04/12/2025    POTASSIUM 3.6 04/12/2025    CHLORIDE 99 04/12/2025    CO2 24 04/12/2025    BUN 14.1 04/12/2025    CR 1.27 (H) 04/12/2025     (H) 04/12/2025    JANELLE 9.5 04/12/2025       INR  INR   Date Value Ref Range Status   04/12/2025 1.04 0.85 - 1.15 Final       Data   Stroke Code Data  (for stroke code with tele)  Stroke code activated 04/12/25  1800   First stroke provider response 04/12/25  1801   Video start time 04/12/25  1817   Video end time 04/12/25  1830   Last known normal 04/12/25  1600   Time of discovery (or onset of symptoms)  04/12/25  1600   Head CT read by Stroke Neuro Provider 04/12/25  1815   Was stroke code de-escalated? No  04/12/25  1836     Telestroke Service Details  Type of service telemedicine diagnostic assessment of acute neurological changes   Reason telemedicine is appropriate patient requires assessment with a specialist for diagnosis and treatment of neurological symptoms   Mode of transmission secure interactive audio and video communication per Bibiana   Originating site (patient location) [facility]    Distant site (provider location) [facility]        Clinically Significant Risk Factors Present on Admission

## 2025-04-13 NOTE — DISCHARGE INSTRUCTIONS
1) Your evaluation today did not confirm a finding of acute stroke symptoms based on history and symptoms reported prior to arrival.  There was an abnormal finding of a subdural hygroma or chronic subdural hematoma on head CT and a torturous carotid artery on CTA.  Brain MRI did confirm the hygromas appreciated on CT.    2) You elected to go home rather than be admitted for observation given your symptoms reported and concerns discussed you have expressed a plan for follow-up with your care team including an [facility] and follow-up for hypertension with your care team on Thursday-4/17.  Blood pressure medication due to concern for untreated hypertension and risk  associated with untreated hypertension including risk for stroke, heart attack    3) Although you have elected to go home rather than be admitted for  observation and care due to the possibility your symptoms could have been due to a TIA or mini stroke. if symptoms worsen or recur other new concerns you should return to be reexamined.    4) Consider taking a baby aspirin moving forward with follow-up with your care team as reviewed.  Neurology referral was placed to help with follow-up care coordination needs if not able to be coordinated by her care team through the [facility] or current treating care team

## 2025-04-13 NOTE — INTERIM SUMMARY
I was paged about MRI Brain results, MRI brain neg for any acute ischemic injury.      We had recommended admission for TIA or other etiology work up        Bernardino Yañez  Stroke Fellow

## 2025-04-16 LAB
ATRIAL RATE - MUSE: 99 BPM
DIASTOLIC BLOOD PRESSURE - MUSE: NORMAL MMHG
INTERPRETATION ECG - MUSE: NORMAL
P AXIS - MUSE: 32 DEGREES
PR INTERVAL - MUSE: 176 MS
QRS DURATION - MUSE: 78 MS
QT - MUSE: 328 MS
QTC - MUSE: 420 MS
R AXIS - MUSE: 0 DEGREES
SYSTOLIC BLOOD PRESSURE - MUSE: NORMAL MMHG
T AXIS - MUSE: 9 DEGREES
VENTRICULAR RATE- MUSE: 99 BPM